# Patient Record
Sex: FEMALE | Race: BLACK OR AFRICAN AMERICAN | NOT HISPANIC OR LATINO | URBAN - METROPOLITAN AREA
[De-identification: names, ages, dates, MRNs, and addresses within clinical notes are randomized per-mention and may not be internally consistent; named-entity substitution may affect disease eponyms.]

---

## 2019-04-18 ENCOUNTER — EMERGENCY (EMERGENCY)
Facility: HOSPITAL | Age: 79
LOS: 0 days | Discharge: HOME | End: 2019-04-18
Attending: EMERGENCY MEDICINE
Payer: COMMERCIAL

## 2019-04-18 VITALS
HEART RATE: 71 BPM | SYSTOLIC BLOOD PRESSURE: 129 MMHG | OXYGEN SATURATION: 100 % | RESPIRATION RATE: 20 BRPM | WEIGHT: 145.06 LBS | HEIGHT: 64 IN | DIASTOLIC BLOOD PRESSURE: 85 MMHG

## 2019-04-18 VITALS — TEMPERATURE: 97 F
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
## 2019-04-18 DIAGNOSIS — I10 ESSENTIAL (PRIMARY) HYPERTENSION: ICD-10-CM

## 2019-04-18 DIAGNOSIS — R06.00 DYSPNEA, UNSPECIFIED: ICD-10-CM
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
## 2019-04-18 DIAGNOSIS — R55 SYNCOPE AND COLLAPSE: ICD-10-CM

## 2019-04-18 LAB
ALBUMIN SERPL ELPH-MCNC: 3.7 G/DL — SIGNIFICANT CHANGE UP (ref 3.3–5)
ALP SERPL-CCNC: 101 U/L — SIGNIFICANT CHANGE UP (ref 40–120)
ALT FLD-CCNC: 19 U/L — SIGNIFICANT CHANGE UP (ref 12–78)
ANION GAP SERPL CALC-SCNC: 9 MMOL/L — SIGNIFICANT CHANGE UP (ref 5–17)
APTT BLD: 24 SEC — LOW (ref 28.5–37)
AST SERPL-CCNC: 23 U/L — SIGNIFICANT CHANGE UP (ref 15–37)
BASOPHILS # BLD AUTO: 0.03 K/UL — SIGNIFICANT CHANGE UP (ref 0–0.2)
BASOPHILS NFR BLD AUTO: 0.9 % — SIGNIFICANT CHANGE UP (ref 0–2)
BILIRUB SERPL-MCNC: 0.4 MG/DL — SIGNIFICANT CHANGE UP (ref 0.2–1.2)
BUN SERPL-MCNC: 19 MG/DL — SIGNIFICANT CHANGE UP (ref 7–23)
CALCIUM SERPL-MCNC: 9.4 MG/DL — SIGNIFICANT CHANGE UP (ref 8.5–10.1)
CHLORIDE SERPL-SCNC: 103 MMOL/L — SIGNIFICANT CHANGE UP (ref 96–108)
CK MB BLD-MCNC: 1 % — SIGNIFICANT CHANGE UP (ref 0–3.5)
CK MB CFR SERPL CALC: 1.5 NG/ML — SIGNIFICANT CHANGE UP (ref 0.5–3.6)
CK SERPL-CCNC: 152 U/L — SIGNIFICANT CHANGE UP (ref 26–192)
CO2 SERPL-SCNC: 30 MMOL/L — SIGNIFICANT CHANGE UP (ref 22–31)
CREAT SERPL-MCNC: 1.21 MG/DL — SIGNIFICANT CHANGE UP (ref 0.5–1.3)
EOSINOPHIL # BLD AUTO: 0.03 K/UL — SIGNIFICANT CHANGE UP (ref 0–0.5)
EOSINOPHIL NFR BLD AUTO: 0.9 % — SIGNIFICANT CHANGE UP (ref 0–6)
GLUCOSE SERPL-MCNC: 135 MG/DL — HIGH (ref 70–99)
HCT VFR BLD CALC: 38.9 % — SIGNIFICANT CHANGE UP (ref 34.5–45)
HGB BLD-MCNC: 12.1 G/DL — SIGNIFICANT CHANGE UP (ref 11.5–15.5)
IMM GRANULOCYTES NFR BLD AUTO: 0.3 % — SIGNIFICANT CHANGE UP (ref 0–1.5)
INR BLD: 1.1 RATIO — SIGNIFICANT CHANGE UP (ref 0.88–1.16)
LYMPHOCYTES # BLD AUTO: 0.64 K/UL — LOW (ref 1–3.3)
LYMPHOCYTES # BLD AUTO: 19.8 % — SIGNIFICANT CHANGE UP (ref 13–44)
MCHC RBC-ENTMCNC: 26.8 PG — LOW (ref 27–34)
MCHC RBC-ENTMCNC: 31.1 GM/DL — LOW (ref 32–36)
MCV RBC AUTO: 86.1 FL — SIGNIFICANT CHANGE UP (ref 80–100)
MONOCYTES # BLD AUTO: 0.26 K/UL — SIGNIFICANT CHANGE UP (ref 0–0.9)
MONOCYTES NFR BLD AUTO: 8 % — SIGNIFICANT CHANGE UP (ref 2–14)
NEUTROPHILS # BLD AUTO: 2.26 K/UL — SIGNIFICANT CHANGE UP (ref 1.8–7.4)
NEUTROPHILS NFR BLD AUTO: 70.1 % — SIGNIFICANT CHANGE UP (ref 43–77)
NRBC # BLD: 0 /100 WBCS — SIGNIFICANT CHANGE UP (ref 0–0)
PLATELET # BLD AUTO: 257 K/UL — SIGNIFICANT CHANGE UP (ref 150–400)
POTASSIUM SERPL-MCNC: 3.6 MMOL/L — SIGNIFICANT CHANGE UP (ref 3.5–5.3)
POTASSIUM SERPL-SCNC: 3.6 MMOL/L — SIGNIFICANT CHANGE UP (ref 3.5–5.3)
PROT SERPL-MCNC: 8.2 GM/DL — SIGNIFICANT CHANGE UP (ref 6–8.3)
PROTHROM AB SERPL-ACNC: 12.3 SEC — SIGNIFICANT CHANGE UP (ref 10–12.9)
RBC # BLD: 4.52 M/UL — SIGNIFICANT CHANGE UP (ref 3.8–5.2)
RBC # FLD: 16.4 % — HIGH (ref 10.3–14.5)
SODIUM SERPL-SCNC: 142 MMOL/L — SIGNIFICANT CHANGE UP (ref 135–145)
TROPONIN I SERPL-MCNC: <.015 NG/ML — SIGNIFICANT CHANGE UP (ref 0.01–0.04)
TROPONIN I SERPL-MCNC: <.015 NG/ML — SIGNIFICANT CHANGE UP (ref 0.01–0.04)
WBC # BLD: 3.23 K/UL — LOW (ref 3.8–10.5)
WBC # FLD AUTO: 3.23 K/UL — LOW (ref 3.8–10.5)

## 2019-04-18 PROCEDURE — 93010 ELECTROCARDIOGRAM REPORT: CPT

## 2019-04-18 PROCEDURE — 71045 X-RAY EXAM CHEST 1 VIEW: CPT | Mod: 26

## 2019-04-18 PROCEDURE — 99284 EMERGENCY DEPT VISIT MOD MDM: CPT

## 2019-04-18 NOTE — ED ADULT TRIAGE NOTE - CHIEF COMPLAINT QUOTE
ems states, " IPt was at gym and had a syncopal episode, low bp in field 64/48 in field, pt now awake, and bp 120/70"

## 2019-04-18 NOTE — ED PROVIDER NOTE - OBJECTIVE STATEMENT
78 year old female with PMH of HTN presenting to ED due to dizziness after being on elliptical for 40 minutes then sat down and felt better after some rest- was told to come into ED for evaluation -denies any chest pain no LOC.

## 2019-04-18 NOTE — ED ADULT NURSE NOTE - NSIMPLEMENTINTERV_GEN_ALL_ED
Implemented All Fall Risk Interventions:  [place] to call system. Call bell, personal items and telephone within reach. Instruct patient to call for assistance. Room bathroom lighting operational. Non-slip footwear when patient is off stretcher. Physically safe environment: no spills, clutter or unnecessary equipment. Stretcher in lowest position, wheels locked, appropriate side rails in place. Provide visual cue, wrist band, yellow gown, etc. Monitor gait and stability. Monitor for mental status changes and reorient to person, place, and time. Review medications for side effects contributing to fall risk. Reinforce activity limits and safety measures with patient and family.

## 2019-04-18 NOTE — ED ADULT NURSE NOTE - OBJECTIVE STATEMENT
79 y/o female A&Ox4, [place], PMH HTN, presents to ED s/p near syncopal episode occuring earlier today. Pt states she was "at the spa" when she began to feel dizzy and light-headed. Pt states she was assisted by staff to ground, pt denies head trauma/falls. Sent ot ED BIBEMS for further evaluation. Upon further assessment, airway patent and intact, denies chest pain/SOB at the moment. ABD soft nontender, Skin intact. Peripheral pulses strong and normal baseline sensation present x4. Safety and comfort measures maintained.

## 2025-03-08 ENCOUNTER — INPATIENT (INPATIENT)
Facility: HOSPITAL | Age: 85
LOS: 16 days | Discharge: NURSING, REHAB OR LONG-TERM CARE | End: 2025-03-25
Attending: INTERNAL MEDICINE | Admitting: INTERNAL MEDICINE
Payer: MEDICARE

## 2025-03-08 VITALS
WEIGHT: 145.06 LBS | TEMPERATURE: 98 F | RESPIRATION RATE: 20 BRPM | HEART RATE: 63 BPM | DIASTOLIC BLOOD PRESSURE: 69 MMHG | OXYGEN SATURATION: 100 % | HEIGHT: 67 IN | SYSTOLIC BLOOD PRESSURE: 115 MMHG

## 2025-03-08 DIAGNOSIS — F03.90 UNSPECIFIED DEMENTIA, UNSPECIFIED SEVERITY, WITHOUT BEHAVIORAL DISTURBANCE, PSYCHOTIC DISTURBANCE, MOOD DISTURBANCE, AND ANXIETY: ICD-10-CM

## 2025-03-08 DIAGNOSIS — I10 ESSENTIAL (PRIMARY) HYPERTENSION: ICD-10-CM

## 2025-03-08 DIAGNOSIS — G93.41 METABOLIC ENCEPHALOPATHY: ICD-10-CM

## 2025-03-08 DIAGNOSIS — K44.9 DIAPHRAGMATIC HERNIA WITHOUT OBSTRUCTION OR GANGRENE: ICD-10-CM

## 2025-03-08 DIAGNOSIS — F20.0 PARANOID SCHIZOPHRENIA: ICD-10-CM

## 2025-03-08 DIAGNOSIS — N17.9 ACUTE KIDNEY FAILURE, UNSPECIFIED: ICD-10-CM

## 2025-03-08 DIAGNOSIS — N39.0 URINARY TRACT INFECTION, SITE NOT SPECIFIED: ICD-10-CM

## 2025-03-08 LAB
ALBUMIN SERPL ELPH-MCNC: 3.7 G/DL — SIGNIFICANT CHANGE UP (ref 3.3–5)
ALP SERPL-CCNC: 99 U/L — SIGNIFICANT CHANGE UP (ref 40–120)
ALT FLD-CCNC: 19 U/L — SIGNIFICANT CHANGE UP (ref 12–78)
ANION GAP SERPL CALC-SCNC: 7 MMOL/L — SIGNIFICANT CHANGE UP (ref 5–17)
ANISOCYTOSIS BLD QL: SLIGHT — SIGNIFICANT CHANGE UP
APPEARANCE UR: ABNORMAL
AST SERPL-CCNC: 33 U/L — SIGNIFICANT CHANGE UP (ref 15–37)
BACTERIA # UR AUTO: ABNORMAL /HPF
BASOPHILS # BLD AUTO: 0 K/UL — SIGNIFICANT CHANGE UP (ref 0–0.2)
BASOPHILS NFR BLD AUTO: 0 % — SIGNIFICANT CHANGE UP (ref 0–2)
BILIRUB SERPL-MCNC: 1.6 MG/DL — HIGH (ref 0.2–1.2)
BILIRUB UR-MCNC: ABNORMAL
BUN SERPL-MCNC: 25 MG/DL — HIGH (ref 7–23)
CALCIUM SERPL-MCNC: 10.6 MG/DL — HIGH (ref 8.5–10.1)
CHLORIDE SERPL-SCNC: 109 MMOL/L — HIGH (ref 96–108)
CO2 SERPL-SCNC: 29 MMOL/L — SIGNIFICANT CHANGE UP (ref 22–31)
COLOR SPEC: ABNORMAL
CREAT SERPL-MCNC: 1.32 MG/DL — HIGH (ref 0.5–1.3)
DIFF PNL FLD: ABNORMAL
EGFR: 40 ML/MIN/1.73M2 — LOW
EGFR: 40 ML/MIN/1.73M2 — LOW
ELLIPTOCYTES BLD QL SMEAR: SLIGHT — SIGNIFICANT CHANGE UP
EOSINOPHIL # BLD AUTO: 0 K/UL — SIGNIFICANT CHANGE UP (ref 0–0.5)
EOSINOPHIL NFR BLD AUTO: 0 % — SIGNIFICANT CHANGE UP (ref 0–6)
EPI CELLS # UR: PRESENT
FLUAV AG NPH QL: SIGNIFICANT CHANGE UP
FLUBV AG NPH QL: SIGNIFICANT CHANGE UP
GLUCOSE SERPL-MCNC: 104 MG/DL — HIGH (ref 70–99)
GLUCOSE UR QL: 100 MG/DL
HCT VFR BLD CALC: 43.8 % — SIGNIFICANT CHANGE UP (ref 34.5–45)
HGB BLD-MCNC: 14.1 G/DL — SIGNIFICANT CHANGE UP (ref 11.5–15.5)
KETONES UR-MCNC: 80 MG/DL
LEUKOCYTE ESTERASE UR-ACNC: ABNORMAL
LYMPHOCYTES # BLD AUTO: 0.38 K/UL — LOW (ref 1–3.3)
LYMPHOCYTES # BLD AUTO: 8 % — LOW (ref 13–44)
MANUAL SMEAR VERIFICATION: SIGNIFICANT CHANGE UP
MCHC RBC-ENTMCNC: 30.3 PG — SIGNIFICANT CHANGE UP (ref 27–34)
MCHC RBC-ENTMCNC: 32.2 G/DL — SIGNIFICANT CHANGE UP (ref 32–36)
MCV RBC AUTO: 94 FL — SIGNIFICANT CHANGE UP (ref 80–100)
MONOCYTES # BLD AUTO: 0.24 K/UL — SIGNIFICANT CHANGE UP (ref 0–0.9)
MONOCYTES NFR BLD AUTO: 5 % — SIGNIFICANT CHANGE UP (ref 2–14)
NEUTROPHILS # BLD AUTO: 4 K/UL — SIGNIFICANT CHANGE UP (ref 1.8–7.4)
NEUTROPHILS NFR BLD AUTO: 85 % — HIGH (ref 43–77)
NITRITE UR-MCNC: POSITIVE
NRBC # BLD: 0 /100 WBCS — SIGNIFICANT CHANGE UP (ref 0–0)
NRBC BLD AUTO-RTO: SIGNIFICANT CHANGE UP /100 WBCS (ref 0–0)
NRBC BLD-RTO: 0 /100 WBCS — SIGNIFICANT CHANGE UP (ref 0–0)
PH UR: 6 — SIGNIFICANT CHANGE UP (ref 5–8)
PLAT MORPH BLD: NORMAL — SIGNIFICANT CHANGE UP
PLATELET # BLD AUTO: 175 K/UL — SIGNIFICANT CHANGE UP (ref 150–400)
POIKILOCYTOSIS BLD QL AUTO: SLIGHT — SIGNIFICANT CHANGE UP
POTASSIUM SERPL-MCNC: 5.3 MMOL/L — SIGNIFICANT CHANGE UP (ref 3.5–5.3)
POTASSIUM SERPL-SCNC: 5.3 MMOL/L — SIGNIFICANT CHANGE UP (ref 3.5–5.3)
PROT SERPL-MCNC: 8.2 GM/DL — SIGNIFICANT CHANGE UP (ref 6–8.3)
PROT UR-MCNC: 300 MG/DL
RBC # BLD: 4.66 M/UL — SIGNIFICANT CHANGE UP (ref 3.8–5.2)
RBC # FLD: 14 % — SIGNIFICANT CHANGE UP (ref 10.3–14.5)
RBC BLD AUTO: ABNORMAL
RBC CASTS # UR COMP ASSIST: 30 /HPF — HIGH (ref 0–4)
RSV RNA NPH QL NAA+NON-PROBE: SIGNIFICANT CHANGE UP
SARS-COV-2 RNA SPEC QL NAA+PROBE: SIGNIFICANT CHANGE UP
SODIUM SERPL-SCNC: 145 MMOL/L — SIGNIFICANT CHANGE UP (ref 135–145)
SP GR SPEC: >1.03 — HIGH (ref 1–1.03)
UROBILINOGEN FLD QL: 1 MG/DL — SIGNIFICANT CHANGE UP (ref 0.2–1)
VARIANT LYMPHS # BLD: 2 % — SIGNIFICANT CHANGE UP (ref 0–6)
VARIANT LYMPHS NFR BLD MANUAL: 2 % — SIGNIFICANT CHANGE UP (ref 0–6)
WBC # BLD: 4.7 K/UL — SIGNIFICANT CHANGE UP (ref 3.8–10.5)
WBC # FLD AUTO: 4.7 K/UL — SIGNIFICANT CHANGE UP (ref 3.8–10.5)
WBC UR QL: 10 /HPF — HIGH (ref 0–5)

## 2025-03-08 PROCEDURE — 71046 X-RAY EXAM CHEST 2 VIEWS: CPT | Mod: 26

## 2025-03-08 PROCEDURE — 93010 ELECTROCARDIOGRAM REPORT: CPT

## 2025-03-08 PROCEDURE — 99285 EMERGENCY DEPT VISIT HI MDM: CPT

## 2025-03-08 PROCEDURE — 99222 1ST HOSP IP/OBS MODERATE 55: CPT

## 2025-03-08 PROCEDURE — 70450 CT HEAD/BRAIN W/O DYE: CPT | Mod: 26

## 2025-03-08 RX ORDER — MELATONIN 5 MG
3 TABLET ORAL AT BEDTIME
Refills: 0 | Status: DISCONTINUED | OUTPATIENT
Start: 2025-03-08 | End: 2025-03-25

## 2025-03-08 RX ORDER — CEFTRIAXONE 500 MG/1
1000 INJECTION, POWDER, FOR SOLUTION INTRAMUSCULAR; INTRAVENOUS EVERY 24 HOURS
Refills: 0 | Status: COMPLETED | OUTPATIENT
Start: 2025-03-09 | End: 2025-03-11

## 2025-03-08 RX ORDER — ACETAMINOPHEN 500 MG/5ML
650 LIQUID (ML) ORAL EVERY 6 HOURS
Refills: 0 | Status: DISCONTINUED | OUTPATIENT
Start: 2025-03-08 | End: 2025-03-25

## 2025-03-08 RX ORDER — ONDANSETRON HCL/PF 4 MG/2 ML
4 VIAL (ML) INJECTION EVERY 8 HOURS
Refills: 0 | Status: DISCONTINUED | OUTPATIENT
Start: 2025-03-08 | End: 2025-03-25

## 2025-03-08 RX ORDER — CEFTRIAXONE 500 MG/1
1000 INJECTION, POWDER, FOR SOLUTION INTRAMUSCULAR; INTRAVENOUS ONCE
Refills: 0 | Status: COMPLETED | OUTPATIENT
Start: 2025-03-08 | End: 2025-03-08

## 2025-03-08 RX ORDER — OMEPRAZOLE 20 MG/1
1 CAPSULE, DELAYED RELEASE ORAL
Refills: 0 | DISCHARGE

## 2025-03-08 RX ORDER — AMLODIPINE BESYLATE 10 MG/1
10 TABLET ORAL DAILY
Refills: 0 | Status: DISCONTINUED | OUTPATIENT
Start: 2025-03-08 | End: 2025-03-08

## 2025-03-08 RX ORDER — MAGNESIUM, ALUMINUM HYDROXIDE 200-200 MG
30 TABLET,CHEWABLE ORAL EVERY 4 HOURS
Refills: 0 | Status: DISCONTINUED | OUTPATIENT
Start: 2025-03-08 | End: 2025-03-25

## 2025-03-08 RX ADMIN — CEFTRIAXONE 1000 MILLIGRAM(S): 500 INJECTION, POWDER, FOR SOLUTION INTRAMUSCULAR; INTRAVENOUS at 19:00

## 2025-03-08 RX ADMIN — Medication 500 MILLILITER(S): at 17:29

## 2025-03-08 RX ADMIN — Medication 500 MILLILITER(S): at 16:29

## 2025-03-08 RX ADMIN — CEFTRIAXONE 100 MILLIGRAM(S): 500 INJECTION, POWDER, FOR SOLUTION INTRAMUSCULAR; INTRAVENOUS at 18:30

## 2025-03-08 NOTE — ED PROVIDER NOTE - PROGRESS NOTE DETAILS
GALINA Campbell: all results reviewed with patient and called to speak with family, +UTI, given IV abx, will admit for UTI/AMS and evaluation for placement or services at home, Dr Whitman aware of results, will admit.

## 2025-03-08 NOTE — ED ADULT NURSE NOTE - CHIEF COMPLAINT QUOTE
General weakness, poor appetite, has a home health aide, daughter states camera says she hasn't moved since March 2  to EMS  H/O Dementia HTN [place]

## 2025-03-08 NOTE — ED ADULT NURSE NOTE - OBJECTIVE STATEMENT
83 yo female, alert, confused at baseline per daughter. Presents to ED accompanied by daughter. Per daughter patient is here for generalized weakness, poor appetite, and chest congestion. Per daughter patient has a home health aide, and states camera says patient hasn't moved since March 2. No skin breakdown or injuries noted  H/O Dementia HTN [place]

## 2025-03-08 NOTE — ED ADULT TRIAGE NOTE - CHIEF COMPLAINT QUOTE
General weakness, poor appetite, has a home health aide, daughter states camera says she hasn't moved since March 2  to EMS  H/O Dementia HTN [place]

## 2025-03-08 NOTE — ED ADULT NURSE NOTE - NSFALLRISKINTERV_ED_ALL_ED

## 2025-03-08 NOTE — ED PROVIDER NOTE - CARE PLAN
1 Principal Discharge DX:	UTI (urinary tract infection)  Secondary Diagnosis:	Encounter for social work intervention

## 2025-03-08 NOTE — H&P ADULT - ASSESSMENT
Chloe Blackmon is an 84 year old female with PMHx of HTN, paranoid schizophrenia, and dementia who presented to the ED on 3/8/25 for complaints of inability to care for herself and admitted for acute metabolic encephalopathy secondary to UTI.    Acute metabolic encephalopathy secondary to UTI  Son reports patient with declining mentation since he last saw her two weeks ago  Baseline mentation is AAO x 2 to person and place, now only AAO x 1 to person only  U/A with proteinuria, ketonuria, large bilirubin, small leuks, positive nitrites, WBC 10, RBC 30, moderate bacteria, squamous epithelial cells present, glucosuria, COVID/influenza/RSV negative  CT head without acute intracranial findings, CXR without infectious etiology but with moderate-sized hiatal hernia  S/p ceftriaxone 1 g IV in the ED  Ceftriaxone 1 g IV continued for now  Reorient PRN, avoid sedating meds  F/u TSH, RPR, vitamin B12, MRI brain to r/o other etiologies of AMS  Monitor for improvement in mentation    PATRICIO vs. elevated creatinine, suspect etiology prerenal secondary to decreased PO intake  BUN 25 and Cr 1.32 on admission, unknown baseline but Cr 0.89 (on 12/8/20)  S/p  cc bolus in the ED  Avoid nephrotoxins, renally dose meds  Encourage PO hydration  Monitor renal function    Social admit  Son reports patient with bouts of paranoid schizophrenia where she will chain up her apartment door so no one can come in  Son did not notice movement on cameras in patient's apartment for a week, used  to cut chains upon arrival to her apartment  Found in bed soiled in urine and feces and apartment is unkempt  Previously declining coming to live with son and daughter-in-law in [place]  Son requesting placement in facility for safety as patient is unable to care for herself  Case management and  consulted for assistance in disposition    Hiatal hernia, incidental finding  As seen on CXR  [facility] indicates previously taking omeprazole DR 20 mg  Will need outpatient f/u      Chronic medical conditions:  HTN, uncontrolled secondary to medication noncompliance: BP as elevated as 198/83, will hold off on initiation of antihypertensives for now given patient refusal to take PO meds, [facility] indicates previously on amlodipine 10 mg  Dementia: not on any PTA meds, turn and reposition q2    Unable to complete medication reconciliation as son does not know if patient takes any medications. As per [facility], last picked up amlodipine 10 mg and omeprazole DR 20 mg in May 2024. Please call her pharmacy for med list in AM.    Plan of care discussed with son over the phone. Chloe Blackmon is an 84 year old female with PMHx of HTN, paranoid schizophrenia, and dementia who presented to the ED on 3/8/25 for complaints of inability to care for herself and admitted for acute metabolic encephalopathy secondary to UTI.    Acute metabolic encephalopathy secondary to UTI  Son reports patient with declining mentation since he last saw her two weeks ago  Baseline mentation is AAO x 2 to person and place, now only AAO x 1 to person only  U/A with proteinuria, ketonuria, large bilirubin, small leuks, positive nitrites, WBC 10, RBC 30, moderate bacteria, squamous epithelial cells present, glucosuria, COVID/influenza/RSV negative  CT head without acute intracranial findings, CXR without infectious etiology but with moderate-sized hiatal hernia  S/p ceftriaxone 1 g IV in the ED  Ceftriaxone 1 g IV continued for now  Reorient PRN, avoid sedating meds  F/u urine culture  F/u TSH, RPR, vitamin B12, MRI brain to r/o other etiologies of AMS  Monitor for improvement in mentation    PATRICIO vs. elevated creatinine, suspect etiology prerenal secondary to decreased PO intake  BUN 25 and Cr 1.32 on admission, unknown baseline but Cr 0.89 (on 12/8/20)  S/p  cc bolus in the ED  Avoid nephrotoxins, renally dose meds  Encourage PO hydration  Monitor renal function    Social admit  Son reports patient with bouts of paranoid schizophrenia where she will chain up her apartment door so no one can come in  Son did not notice movement on cameras in patient's apartment for a week, used  to cut chains upon arrival to her apartment  Found in bed soiled in urine and feces and apartment is unkempt  Previously declining coming to live with son and daughter-in-law in [place]  Son requesting placement in facility for safety as patient is unable to care for herself  Case management and  consulted for assistance in disposition    Hiatal hernia, incidental finding  As seen on CXR  [facility] indicates previously taking omeprazole DR 20 mg  Will need outpatient f/u      Chronic medical conditions:  HTN, uncontrolled secondary to medication noncompliance: BP as elevated as 198/83, will hold off on initiation of antihypertensives for now given patient refusal to take PO meds, [facility] indicates previously on amlodipine 10 mg  Dementia: not on any PTA meds, turn and reposition q2    Unable to complete medication reconciliation as son does not know if patient takes any medications. As per [facility], last picked up amlodipine 10 mg and omeprazole DR 20 mg in May 2024. Please call her pharmacy for med list in AM.    Plan of care discussed with son over the phone.

## 2025-03-08 NOTE — ED PROVIDER NOTE - CLINICAL SUMMARY MEDICAL DECISION MAKING FREE TEXT BOX
GALINA Campbell: 84-year-old female with no reported past medical history per daughter-in-law at bedside other than dementia, hard of hearing presents emergency room for generalized weakness.  Daughter states that there was no movement on the plane, for the past 6 days, went there today and had abrasions to the apartment and saw her laying in bed, had difficulty getting up out of bed herself.  States she has no help at home, lives independently and normally ambulates without difficulty.  States for the past 3 weeks they have not been able to get her on the phone but still saw movement on the cameras.  Patient denying any pain or complaints at this time.  No home health aide at home.  Vital signs stable, well-appearing in no acute distress, alert and oriented x 2, heart and lungs normal on exam, abdomen soft nontender nondistended, no evidence of trauma, full range of motion of all extremities, no spinal tenderness, no focal neurodeficit.  Concern for progression of dementia versus acute infection (UTI versus pneumonia), less likely intracranial pathology such as intracranial hemorrhage for stroke or viral syndrome.  Will get labs, IV fluids, urine, chest x-ray, CT head.  Will plan for admission for social work at minimum and placement and/or services at home.    Son Carlos Blackmon [phone] (next of kin); Daughter in law Sandra Blackmon [phone]

## 2025-03-08 NOTE — GOALS OF CARE CONVERSATION - ADVANCED CARE PLANNING - CONVERSATION DETAILS
Discussed with son, Carlos Blackmon over the phone as patient is unable to make her own decisions due to dementia. As per son, code status is full code. Would want chest compressions and intubation if needed. Wants all life-sustaining measures. No limitations on care at this time. . Decision maker is sonCarlos ([phone]).

## 2025-03-08 NOTE — H&P ADULT - TIME BILLING
coordination of care with ER PA and ER RN, obtaining history, performing a physical examination, reviewing and interpreting labs and imaging, ordering further studies and tests, explaining the diagnosis and treatment plan to son over the phone, attempting to complete medication reconciliation to the best of my ability, and documentation  as above.

## 2025-03-08 NOTE — H&P ADULT - NSHPPHYSICALEXAM_GEN_ALL_CORE
T(C): 36.7 (03-08-25 @ 19:52), Max: 36.8 (03-08-25 @ 15:32)  HR: 71 (03-08-25 @ 19:52) (58 - 71)  BP: 156/84 (03-08-25 @ 19:52) (115/69 - 198/83)  RR: 18 (03-08-25 @ 19:52) (16 - 20)  SpO2: 98% (03-08-25 @ 19:52) (95% - 100%)    CONSTITUTIONAL: no apparent distress  EYES: PERRLA and symmetric, EOMI  ENMT: Oral mucosa with dry membranes, hard of hearing b/l  RESP: No respiratory distress, no use of accessory muscles; CTA b/l  CV: RRR  GI: Soft, NT, ND  NEURO: alert, responds to name, unable to perform neurological exam

## 2025-03-08 NOTE — H&P ADULT - HISTORY OF PRESENT ILLNESS
Chloe Blackmon is an 84 year old female with PMHx of HTN, paranoid schizophrenia, and dementia who presented to the ED on 3/8/25 for complaints of inability to care for herself.    History primarily obtained from son, Carlos Blackmon ([phone]) over the phone as patient is unable to provide history due to dementia and AMS. Son reports he last came to visit patient about two weeks ago. At that time, ambulatory unassisted and independent with all ADLs. There are cameras set up in patient's apartment and son noticed there was no movement reported on the cameras for the past week. He attempted to call her home phone but she did not . Therefore, he drove from [place] to her home today. When he arrived, he saw a chain on her apartment door. Son reports patient has bouts of paranoid schizophrenia where she chains up her door so no one can come inside. He cut the chain off with bolt cutters and found patient in bed covered in urine and feces. Son states there is no camera in her bedroom. Of note, patient is very hard of hearing and has a phone for hearing impaired but son states she stopped picking up the phone and refuses to wear hearing aids. Son states patient is supposed to take antihypertensive but refuses to take any medications. In addition, son states patient's apartment is unkempt and smells. Son does not think it is safe for patient to live alone and states she has refused to come live with him at his house so therefore, he would like her placed in a facility for safety. Patient herself is unable to provide any history and just keeps saying, "I do not hear well" despite me shouting in attempt to obtain history from patient.    In the ED, VSS except BP as elevated as 198/83. CBC unremarkable. BUN 25, Cr 1.32, Tbili 1.6. U/A with proteinuria, ketonuria, large bilirubin, small leuks, positive nitrites, WBC 10, RBC 30, moderate bacteria, squamous epithelial cells present, glucosuria. COVID/influenza/RSV negative. CT head without acute intracranial findings. CXR with moderate-sized hiatal hernia. Received  cc bolus and ceftriaxone 1 g IV.

## 2025-03-08 NOTE — H&P ADULT - NSHPLABSRESULTS_GEN_ALL_CORE
14.1   4.70  )-----------( 175      ( 08 Mar 2025 13:40 )             43.8     145  |  109[H]  |  25[H]  ----------------------------<  104[H]     03-08  5.3   |  29  |  1.32[H]    Ca    10.6[H]      08 Mar 2025 13:40  Phos  3.5     03-08  Mg     2.4     03-08    TPro  8.2  /  Alb  3.7  /  TBili  1.6[H]  /  DBili  x   /  AST  33  /  ALT  19  /  AlkPhos  99  03-08    Urinalysis Basic - ( 08 Mar 2025 17:20 )  Color: Orange / Appearance: Cloudy / SG: >1.030 / pH: 6.0  Gluc: 100 mg/dL / Ketone: 80 mg/dL  / Bili: Large / Urobili: 1.0 mg/dL   Blood: Non Hemolyzed Trace / Protein: 300 mg/dL / Nitrite: Positive   Leuk Esterase: Small / RBC: 30 /HPF / WBC 10 /HPF   Sq Epi: x / Bacteria: Moderate /HPF  Hyaline Casts: x/WBC Casts: x    Urinalysis with Rflx Culture (collected 08 Mar 2025 17:20)    CT head without contrast 3/8/25  IMPRESSION:  No evidence of acute intracranial hemorrhage or midline shift.    Chest x-ray 3/8/25  FINDINGS/IMPRESSION:   The heart size, mediastinal and hilar contours are unchanged and within normal limits. Again noted is a moderate-sized hiatal hernia. Lung zones are clear. Soft tissues and osseous structures are intact.

## 2025-03-09 LAB
ANION GAP SERPL CALC-SCNC: 7 MMOL/L — SIGNIFICANT CHANGE UP (ref 5–17)
BUN SERPL-MCNC: 22 MG/DL — SIGNIFICANT CHANGE UP (ref 7–23)
CALCIUM SERPL-MCNC: 10.3 MG/DL — HIGH (ref 8.5–10.1)
CHLORIDE SERPL-SCNC: 111 MMOL/L — HIGH (ref 96–108)
CO2 SERPL-SCNC: 30 MMOL/L — SIGNIFICANT CHANGE UP (ref 22–31)
CREAT SERPL-MCNC: 1.11 MG/DL — SIGNIFICANT CHANGE UP (ref 0.5–1.3)
CULTURE RESULTS: SIGNIFICANT CHANGE UP
EGFR: 49 ML/MIN/1.73M2 — LOW
EGFR: 49 ML/MIN/1.73M2 — LOW
GLUCOSE SERPL-MCNC: 93 MG/DL — SIGNIFICANT CHANGE UP (ref 70–99)
POTASSIUM SERPL-MCNC: 3.7 MMOL/L — SIGNIFICANT CHANGE UP (ref 3.5–5.3)
POTASSIUM SERPL-SCNC: 3.7 MMOL/L — SIGNIFICANT CHANGE UP (ref 3.5–5.3)
SODIUM SERPL-SCNC: 148 MMOL/L — HIGH (ref 135–145)
SPECIMEN SOURCE: SIGNIFICANT CHANGE UP
TSH SERPL-MCNC: 1.98 UU/ML — SIGNIFICANT CHANGE UP (ref 0.36–3.74)
VIT B12 SERPL-MCNC: 1482 PG/ML — HIGH (ref 232–1245)

## 2025-03-09 PROCEDURE — 99232 SBSQ HOSP IP/OBS MODERATE 35: CPT

## 2025-03-09 RX ORDER — SODIUM CHLORIDE 9 G/1000ML
1000 INJECTION, SOLUTION INTRAVENOUS
Refills: 0 | Status: DISCONTINUED | OUTPATIENT
Start: 2025-03-09 | End: 2025-03-12

## 2025-03-09 RX ADMIN — SODIUM CHLORIDE 100 MILLILITER(S): 9 INJECTION, SOLUTION INTRAVENOUS at 14:09

## 2025-03-09 RX ADMIN — SODIUM CHLORIDE 100 MILLILITER(S): 9 INJECTION, SOLUTION INTRAVENOUS at 21:13

## 2025-03-09 RX ADMIN — CEFTRIAXONE 100 MILLIGRAM(S): 500 INJECTION, POWDER, FOR SOLUTION INTRAMUSCULAR; INTRAVENOUS at 17:35

## 2025-03-09 NOTE — PATIENT PROFILE ADULT - FUNCTIONAL ASSESSMENT - BASIC MOBILITY 6.
1-calculated by average/Not able to assess (calculate score using [facility] averaging method)

## 2025-03-10 LAB — T PALLIDUM AB TITR SER: NEGATIVE — SIGNIFICANT CHANGE UP

## 2025-03-10 PROCEDURE — 71250 CT THORAX DX C-: CPT | Mod: 26

## 2025-03-10 PROCEDURE — 99232 SBSQ HOSP IP/OBS MODERATE 35: CPT

## 2025-03-10 RX ADMIN — SODIUM CHLORIDE 100 MILLILITER(S): 9 INJECTION, SOLUTION INTRAVENOUS at 05:16

## 2025-03-10 RX ADMIN — CEFTRIAXONE 100 MILLIGRAM(S): 500 INJECTION, POWDER, FOR SOLUTION INTRAMUSCULAR; INTRAVENOUS at 17:07

## 2025-03-10 RX ADMIN — SODIUM CHLORIDE 100 MILLILITER(S): 9 INJECTION, SOLUTION INTRAVENOUS at 22:28

## 2025-03-10 NOTE — PHYSICAL THERAPY INITIAL EVALUATION ADULT - TRANSFER TRAINING, PT EVAL
Pt will be able to perform sit to stand, stand pivot transfer using [RW]  with CGA in 2 to 3 weeks  to improve functional transfers between any 2 surfaces

## 2025-03-10 NOTE — CONSULT NOTE ADULT - ASSESSMENT
84F with left breast wound    Continue local wound care  no further surgical intervention indicated at this time  Continue management per primary team 84F with left breast wound    Continue local wound care with xeroform gauze, covered with dry sterile dressing  no further surgical intervention indicated at this time  Continue management per primary team

## 2025-03-10 NOTE — PHYSICAL THERAPY INITIAL EVALUATION ADULT - BALANCE TRAINING, PT EVAL
Pt will improve static & dynamic standing balance to Good using [Rolling walker]   to perform  Gait  with sup in 2 to 3 weeks

## 2025-03-10 NOTE — PHYSICAL THERAPY INITIAL EVALUATION ADULT - GAIT TRAINING, PT EVAL
Pt will be able to ambulate 100 feet using [RW] with CGA in 2 to 3 weeks to improve functional mobility

## 2025-03-10 NOTE — PROGRESS NOTE ADULT - ASSESSMENT
Chloe Blackmon is an 84 year old female with PMHx of HTN, paranoid schizophrenia, and dementia who presented to the ED on 3/8/25 for complaints of inability to care for herself and admitted for acute metabolic encephalopathy secondary to UTI.    ##Acute metabolic encephalopathy secondary to UTI  Son reports patient with declining mentation since he last saw her two weeks ago  Baseline mentation is AAO x 2 to person and place, now only AAO x 1 to person only  U/A with proteinuria, ketonuria, large bilirubin, small leuks, positive nitrites, WBC 10, RBC 30, moderate bacteria, squamous epithelial cells present, glucosuria, COVID/influenza/RSV negative  CT head without acute intracranial findings, CXR without infectious etiology but with moderate-sized hiatal hernia. S/p ceftriaxone 1 g IV in the ED. B12 elevated.   - Ceftriaxone 1 g IV continued for now  - Reorient PRN, avoid sedating meds  - F/u urine culture  - F/u TSH, RPR, MRI brain to r/o other etiologies of AMS    #PATRICIO vs. elevated creatinine, suspect etiology prerenal secondary to decreased PO intake  BUN 25 and Cr 1.32 on admission, improved to 1.11.   S/p  cc bolus in the ED  Avoid nephrotoxins, renally dose meds  Encourage PO hydration  Monitor renal function    #Social admit  Son reports patient with bouts of paranoid schizophrenia where she will chain up her apartment door so no one can come in. Son did not notice movement on cameras in patient's apartment for a week, used  to cut chains upon arrival to her apartment. Found in bed soiled in urine and feces and apartment is unkempt  Previously declining coming to live with son and daughter-in-law in [place]. Son requesting placement in facility for safety as patient is unable to care for herself. Case management and  consulted for assistance in disposition    #Hiatal hernia, incidental finding  - Will need outpatient f/u, family aware     #HTN, uncontrolled secondary to medication noncompliance  BP as elevated as 198/83, will hold off on initiation of antihypertensives for now given patient refusal to take PO meds, [facility] indicates previously on amlodipine 10 mg    #Dementia: not on any PTA meds, turn and reposition q2    Diet: regular   DVT prophylaxis:   Dispo: Admit, will need placement   Code Status: FC     I have spent a total of 38 minutes to prepare to see the patient, obtaining and reviewing history, physical examination, explaining the diagnosis, prognosis and treatment plan with the patient/family/caregiver. I also have spent the time ordering studies and testing, interpreting results, medicine reconciliation, IDRs, subspecialty consultation and documentation as above.       Chloe Blackmon is an 84 year old female with PMHx of HTN, paranoid schizophrenia, and dementia who presented to the ED on 3/8/25 for complaints of inability to care for herself and admitted for acute metabolic encephalopathy secondary to UTI.    ##Acute metabolic encephalopathy secondary to UTI  Son reports patient with declining mentation since he last saw her two weeks ago  Baseline mentation is AAO x 2 to person and place, now only AAO x 1 to person only  U/A with proteinuria, ketonuria, large bilirubin, small leuks, positive nitrites, WBC 10, RBC 30, moderate bacteria, squamous epithelial cells present, glucosuria, COVID/influenza/RSV negative  CT head without acute intracranial findings, CXR without infectious etiology but with moderate-sized hiatal hernia. S/p ceftriaxone 1 g IV in the ED. B12 elevated. UCx normal filippo. TSH WNL RPR negative.   - Ceftriaxone 1 g IV continued for now  - Pending MR Brain   - Reorient PRN, avoid sedating meds    #PATRICIO vs. elevated creatinine, suspect etiology prerenal secondary to decreased PO intake  BUN 25 and Cr 1.32 on admission, improved to 1.11.   S/p  cc bolus in the ED  Avoid nephrotoxins, renally dose meds  Encourage PO hydration  Monitor renal function    #Social admit  Son reports patient with bouts of paranoid schizophrenia where she will chain up her apartment door so no one can come in. Son did not notice movement on cameras in patient's apartment for a week, used  to cut chains upon arrival to her apartment. Found in bed soiled in urine and feces and apartment is unkempt  Previously declining coming to live with son and daughter-in-law in [place]. Son requesting placement in facility for safety as patient is unable to care for herself. Case management and  consulted for assistance in disposition    #Hiatal hernia, incidental finding  - Will need outpatient f/u, family aware     #HTN, uncontrolled secondary to medication noncompliance  BP as elevated as 198/83, will hold off on initiation of antihypertensives for now given patient refusal to take PO meds, [facility] indicates previously on amlodipine 10 mg    #Dementia: not on any PTA meds, turn and reposition q2    Diet: regular   DVT prophylaxis:   Dispo: Admit, will need placement   Code Status: FC     I have spent a total of 35 minutes to prepare to see the patient, obtaining and reviewing history, physical examination, explaining the diagnosis, prognosis and treatment plan with the patient/family/caregiver. I also have spent the time ordering studies and testing, interpreting results, medicine reconciliation, IDRs, subspecialty consultation and documentation as above.

## 2025-03-10 NOTE — PHYSICAL THERAPY INITIAL EVALUATION ADULT - PATIENT/FAMILY/SIGNIFICANT OTHER GOALS STATEMENT, PT EVAL
[place] standing orders have been placed. Refer to infant’s chart for further details.
To get stronger, to be wound free

## 2025-03-11 LAB
ANION GAP SERPL CALC-SCNC: 9 MMOL/L — SIGNIFICANT CHANGE UP (ref 5–17)
BASOPHILS # BLD AUTO: 0.03 K/UL — SIGNIFICANT CHANGE UP (ref 0–0.2)
BASOPHILS NFR BLD AUTO: 1.4 % — SIGNIFICANT CHANGE UP (ref 0–2)
BUN SERPL-MCNC: 9 MG/DL — SIGNIFICANT CHANGE UP (ref 7–23)
CALCIUM SERPL-MCNC: 9.1 MG/DL — SIGNIFICANT CHANGE UP (ref 8.5–10.1)
CHLORIDE SERPL-SCNC: 98 MMOL/L — SIGNIFICANT CHANGE UP (ref 96–108)
CO2 SERPL-SCNC: 31 MMOL/L — SIGNIFICANT CHANGE UP (ref 22–31)
CREAT SERPL-MCNC: 0.76 MG/DL — SIGNIFICANT CHANGE UP (ref 0.5–1.3)
EGFR: 77 ML/MIN/1.73M2 — SIGNIFICANT CHANGE UP
EGFR: 77 ML/MIN/1.73M2 — SIGNIFICANT CHANGE UP
EOSINOPHIL # BLD AUTO: 0.02 K/UL — SIGNIFICANT CHANGE UP (ref 0–0.5)
EOSINOPHIL NFR BLD AUTO: 1 % — SIGNIFICANT CHANGE UP (ref 0–6)
GLUCOSE BLDC GLUCOMTR-MCNC: 90 MG/DL — SIGNIFICANT CHANGE UP (ref 70–99)
GLUCOSE SERPL-MCNC: 92 MG/DL — SIGNIFICANT CHANGE UP (ref 70–99)
HCT VFR BLD CALC: 36.2 % — SIGNIFICANT CHANGE UP (ref 34.5–45)
HGB BLD-MCNC: 12.5 G/DL — SIGNIFICANT CHANGE UP (ref 11.5–15.5)
IMM GRANULOCYTES NFR BLD AUTO: 1.4 % — HIGH (ref 0–0.9)
LYMPHOCYTES # BLD AUTO: 0.92 K/UL — LOW (ref 1–3.3)
LYMPHOCYTES # BLD AUTO: 44.4 % — HIGH (ref 13–44)
MAGNESIUM SERPL-MCNC: 1.4 MG/DL — LOW (ref 1.6–2.6)
MCHC RBC-ENTMCNC: 30.7 PG — SIGNIFICANT CHANGE UP (ref 27–34)
MCHC RBC-ENTMCNC: 34.5 G/DL — SIGNIFICANT CHANGE UP (ref 32–36)
MCV RBC AUTO: 88.9 FL — SIGNIFICANT CHANGE UP (ref 80–100)
MONOCYTES # BLD AUTO: 0.3 K/UL — SIGNIFICANT CHANGE UP (ref 0–0.9)
MONOCYTES NFR BLD AUTO: 14.5 % — HIGH (ref 2–14)
NEUTROPHILS # BLD AUTO: 0.77 K/UL — LOW (ref 1.8–7.4)
NEUTROPHILS NFR BLD AUTO: 37.3 % — LOW (ref 43–77)
NRBC BLD AUTO-RTO: 0 /100 WBCS — SIGNIFICANT CHANGE UP (ref 0–0)
PLATELET # BLD AUTO: 135 K/UL — LOW (ref 150–400)
POTASSIUM SERPL-MCNC: 2.6 MMOL/L — CRITICAL LOW (ref 3.5–5.3)
POTASSIUM SERPL-MCNC: 3.8 MMOL/L — SIGNIFICANT CHANGE UP (ref 3.5–5.3)
POTASSIUM SERPL-SCNC: 2.6 MMOL/L — CRITICAL LOW (ref 3.5–5.3)
POTASSIUM SERPL-SCNC: 3.8 MMOL/L — SIGNIFICANT CHANGE UP (ref 3.5–5.3)
RBC # BLD: 4.07 M/UL — SIGNIFICANT CHANGE UP (ref 3.8–5.2)
RBC # FLD: 13.5 % — SIGNIFICANT CHANGE UP (ref 10.3–14.5)
SODIUM SERPL-SCNC: 138 MMOL/L — SIGNIFICANT CHANGE UP (ref 135–145)
WBC # BLD: 2.07 K/UL — LOW (ref 3.8–10.5)
WBC # FLD AUTO: 2.07 K/UL — LOW (ref 3.8–10.5)

## 2025-03-11 PROCEDURE — 99232 SBSQ HOSP IP/OBS MODERATE 35: CPT

## 2025-03-11 RX ADMIN — Medication 100 MILLIEQUIVALENT(S): at 09:18

## 2025-03-11 RX ADMIN — CEFTRIAXONE 100 MILLIGRAM(S): 500 INJECTION, POWDER, FOR SOLUTION INTRAMUSCULAR; INTRAVENOUS at 18:57

## 2025-03-11 RX ADMIN — SODIUM CHLORIDE 100 MILLILITER(S): 9 INJECTION, SOLUTION INTRAVENOUS at 04:36

## 2025-03-11 RX ADMIN — SODIUM CHLORIDE 100 MILLILITER(S): 9 INJECTION, SOLUTION INTRAVENOUS at 23:39

## 2025-03-11 RX ADMIN — Medication 100 MILLIEQUIVALENT(S): at 10:07

## 2025-03-11 RX ADMIN — Medication 100 MILLIEQUIVALENT(S): at 11:30

## 2025-03-11 RX ADMIN — Medication 40 MILLIEQUIVALENT(S): at 09:18

## 2025-03-11 NOTE — PROGRESS NOTE ADULT - ASSESSMENT
Chloe Blackmon is an 84 year old female with PMHx of HTN, paranoid schizophrenia, and dementia who presented to the ED on 3/8/25 for complaints of inability to care for herself and admitted for acute metabolic encephalopathy secondary to UTI.    ##Acute metabolic encephalopathy secondary to UTI  Son reports patient with declining mentation since he last saw her two weeks ago  Baseline mentation is AAO x 2 to person and place, now only AAO x 1 to person only  U/A with proteinuria, ketonuria, large bilirubin, small leuks, positive nitrites, WBC 10, RBC 30, moderate bacteria, squamous epithelial cells present, glucosuria, COVID/influenza/RSV negative  CT head without acute intracranial findings, CXR without infectious etiology but with moderate-sized hiatal hernia. S/p ceftriaxone 1 g IV in the ED. B12 elevated. UCx normal filippo. TSH WNL RPR negative.   - Ceftriaxone 1 g IV continued for now  - Pending MR Brain   - Reorient PRN, avoid sedating meds    #PATRICIO vs. elevated creatinine, suspect etiology prerenal secondary to decreased PO intake  BUN 25 and Cr 1.32 on admission, improved to 1.11.   S/p  cc bolus in the ED  Avoid nephrotoxins, renally dose meds  Encourage PO hydration  Monitor renal function    #Social admit  Son reports patient with bouts of paranoid schizophrenia where she will chain up her apartment door so no one can come in. Son did not notice movement on cameras in patient's apartment for a week, used  to cut chains upon arrival to her apartment. Found in bed soiled in urine and feces and apartment is unkempt  Previously declining coming to live with son and daughter-in-law in [place]. Son requesting placement in facility for safety as patient is unable to care for herself. Case management and  consulted for assistance in disposition    #Hiatal hernia, incidental finding  - Will need outpatient f/u, family aware     #HTN, uncontrolled secondary to medication noncompliance  BP as elevated as 198/83, will hold off on initiation of antihypertensives for now given patient refusal to take PO meds, [facility] Script[facility] indicates previously on amlodipine 10 mg    #Dementia: not on any PTA meds, turn and reposition q2    Diet: regular   DVT prophylaxis:   Dispo: Admit, will need placement   Code Status: FC     I have spent a total of 37 minutes to prepare to see the patient, obtaining and reviewing history, physical examination, explaining the diagnosis, prognosis and treatment plan with the patient/family/caregiver. I also have spent the time ordering studies and testing, interpreting results, medicine reconciliation, IDRs, subspecialty consultation and documentation as above.

## 2025-03-12 LAB
ANION GAP SERPL CALC-SCNC: 4 MMOL/L — LOW (ref 5–17)
BASOPHILS # BLD AUTO: 0.02 K/UL — SIGNIFICANT CHANGE UP (ref 0–0.2)
BASOPHILS NFR BLD AUTO: 1 % — SIGNIFICANT CHANGE UP (ref 0–2)
BUN SERPL-MCNC: 9 MG/DL — SIGNIFICANT CHANGE UP (ref 7–23)
CALCIUM SERPL-MCNC: 9.3 MG/DL — SIGNIFICANT CHANGE UP (ref 8.5–10.1)
CHLORIDE SERPL-SCNC: 100 MMOL/L — SIGNIFICANT CHANGE UP (ref 96–108)
CO2 SERPL-SCNC: 31 MMOL/L — SIGNIFICANT CHANGE UP (ref 22–31)
CREAT SERPL-MCNC: 0.85 MG/DL — SIGNIFICANT CHANGE UP (ref 0.5–1.3)
EGFR: 67 ML/MIN/1.73M2 — SIGNIFICANT CHANGE UP
EGFR: 67 ML/MIN/1.73M2 — SIGNIFICANT CHANGE UP
EOSINOPHIL # BLD AUTO: 0.03 K/UL — SIGNIFICANT CHANGE UP (ref 0–0.5)
EOSINOPHIL NFR BLD AUTO: 1.4 % — SIGNIFICANT CHANGE UP (ref 0–6)
GLUCOSE SERPL-MCNC: 89 MG/DL — SIGNIFICANT CHANGE UP (ref 70–99)
HCT VFR BLD CALC: 39.1 % — SIGNIFICANT CHANGE UP (ref 34.5–45)
HGB BLD-MCNC: 13.2 G/DL — SIGNIFICANT CHANGE UP (ref 11.5–15.5)
IMM GRANULOCYTES NFR BLD AUTO: 0.5 % — SIGNIFICANT CHANGE UP (ref 0–0.9)
LYMPHOCYTES # BLD AUTO: 0.7 K/UL — LOW (ref 1–3.3)
LYMPHOCYTES # BLD AUTO: 33.3 % — SIGNIFICANT CHANGE UP (ref 13–44)
MAGNESIUM SERPL-MCNC: 1.5 MG/DL — LOW (ref 1.6–2.6)
MCHC RBC-ENTMCNC: 30.3 PG — SIGNIFICANT CHANGE UP (ref 27–34)
MCHC RBC-ENTMCNC: 33.8 G/DL — SIGNIFICANT CHANGE UP (ref 32–36)
MCV RBC AUTO: 89.9 FL — SIGNIFICANT CHANGE UP (ref 80–100)
MONOCYTES # BLD AUTO: 0.25 K/UL — SIGNIFICANT CHANGE UP (ref 0–0.9)
MONOCYTES NFR BLD AUTO: 11.9 % — SIGNIFICANT CHANGE UP (ref 2–14)
NEUTROPHILS # BLD AUTO: 1.09 K/UL — LOW (ref 1.8–7.4)
NEUTROPHILS NFR BLD AUTO: 51.9 % — SIGNIFICANT CHANGE UP (ref 43–77)
NRBC BLD AUTO-RTO: 0 /100 WBCS — SIGNIFICANT CHANGE UP (ref 0–0)
PLATELET # BLD AUTO: 131 K/UL — LOW (ref 150–400)
POTASSIUM SERPL-MCNC: 3.5 MMOL/L — SIGNIFICANT CHANGE UP (ref 3.5–5.3)
POTASSIUM SERPL-SCNC: 3.5 MMOL/L — SIGNIFICANT CHANGE UP (ref 3.5–5.3)
RBC # BLD: 4.35 M/UL — SIGNIFICANT CHANGE UP (ref 3.8–5.2)
RBC # FLD: 13.5 % — SIGNIFICANT CHANGE UP (ref 10.3–14.5)
SODIUM SERPL-SCNC: 135 MMOL/L — SIGNIFICANT CHANGE UP (ref 135–145)
WBC # BLD: 2.1 K/UL — LOW (ref 3.8–10.5)
WBC # FLD AUTO: 2.1 K/UL — LOW (ref 3.8–10.5)

## 2025-03-12 PROCEDURE — 99222 1ST HOSP IP/OBS MODERATE 55: CPT

## 2025-03-12 PROCEDURE — 70551 MRI BRAIN STEM W/O DYE: CPT | Mod: 26

## 2025-03-12 PROCEDURE — 99233 SBSQ HOSP IP/OBS HIGH 50: CPT

## 2025-03-12 PROCEDURE — 76642 ULTRASOUND BREAST LIMITED: CPT | Mod: 26,LT

## 2025-03-12 RX ORDER — OLANZAPINE 10 MG/1
5 TABLET ORAL ONCE
Refills: 0 | Status: COMPLETED | OUTPATIENT
Start: 2025-03-12 | End: 2025-03-12

## 2025-03-12 RX ORDER — HALOPERIDOL 10 MG/1
2 TABLET ORAL ONCE
Refills: 0 | Status: DISCONTINUED | OUTPATIENT
Start: 2025-03-12 | End: 2025-03-12

## 2025-03-12 RX ORDER — ENOXAPARIN SODIUM 100 MG/ML
40 INJECTION SUBCUTANEOUS EVERY 24 HOURS
Refills: 0 | Status: DISCONTINUED | OUTPATIENT
Start: 2025-03-12 | End: 2025-03-25

## 2025-03-12 RX ORDER — RISPERIDONE 4 MG
0.25 TABLET ORAL
Refills: 0 | Status: DISCONTINUED | OUTPATIENT
Start: 2025-03-13 | End: 2025-03-25

## 2025-03-12 RX ORDER — HALOPERIDOL 10 MG/1
2 TABLET ORAL ONCE
Refills: 0 | Status: COMPLETED | OUTPATIENT
Start: 2025-03-12 | End: 2025-03-13

## 2025-03-12 RX ORDER — MAGNESIUM SULFATE 500 MG/ML
1 SYRINGE (ML) INJECTION ONCE
Refills: 0 | Status: COMPLETED | OUTPATIENT
Start: 2025-03-12 | End: 2025-03-12

## 2025-03-12 RX ADMIN — SODIUM CHLORIDE 100 MILLILITER(S): 9 INJECTION, SOLUTION INTRAVENOUS at 10:44

## 2025-03-12 RX ADMIN — Medication 3 MILLIGRAM(S): at 21:26

## 2025-03-12 RX ADMIN — ENOXAPARIN SODIUM 40 MILLIGRAM(S): 100 INJECTION SUBCUTANEOUS at 21:56

## 2025-03-12 RX ADMIN — OLANZAPINE 5 MILLIGRAM(S): 10 TABLET ORAL at 20:27

## 2025-03-12 RX ADMIN — Medication 100 GRAM(S): at 10:00

## 2025-03-12 NOTE — BH CONSULTATION LIAISON ASSESSMENT NOTE - RISK ASSESSMENT
low risk for intentional, planned out and executed harm to self or others given advanced age, physical frailty and multi-domain cognitive deficits. More likely to unintentionally/accidentally lash out at caretakers during ADL assistance or hurt self by trying to climb out of bed/pulls lines etc secondary to her confused state. Chronic risk factors: advanced age, dementia, lives alone. Protective factors: female gender, no known formal psychiatric hx,  no suicide attempts; no self-injurious behavior; no hx of aggression/violence; no legal issues; no known substance use, motivated for help; articulate; strong family support; access to health services. Acute risk factors identified: worsening dementia

## 2025-03-12 NOTE — BH CONSULTATION LIAISON ASSESSMENT NOTE - CURRENT MEDICATION
MEDICATIONS  (STANDING):  lactated ringers. 1000 milliLiter(s) (100 mL/Hr) IV Continuous <Continuous>  magnesium sulfate  IVPB 1 Gram(s) IV Intermittent once    MEDICATIONS  (PRN):  acetaminophen     Tablet .. 650 milliGRAM(s) Oral every 6 hours PRN Temp greater or equal to 38C (100.4F), Mild Pain (1 - 3)  aluminum hydroxide/magnesium hydroxide/simethicone Suspension 30 milliLiter(s) Oral every 4 hours PRN Dyspepsia  melatonin 3 milliGRAM(s) Oral at bedtime PRN Insomnia  ondansetron Injectable 4 milliGRAM(s) IV Push every 8 hours PRN Nausea and/or Vomiting

## 2025-03-12 NOTE — BH CONSULTATION LIAISON ASSESSMENT NOTE - NSBHCHARTREVIEWLAB_PSY_A_CORE FT
03-12    135  |  100  |  9   ----------------------------<  89  3.5   |  31  |  0.85    Ca    9.3      12 Mar 2025 06:00  Mg     1.5     03-12

## 2025-03-12 NOTE — BH CONSULTATION LIAISON ASSESSMENT NOTE - NSBHCHARTREVIEWINVESTIGATE_PSY_A_CORE FT
CT Chest No Cont (03.10.25 @ 14:42) Skin defect involving the lower aspect of the left breast corresponding to the given history of left breast wound with an associated or contiguous 3.4 x 2.2 cm mass/fluid collection within the lower aspect of the left breast parenchyma

## 2025-03-12 NOTE — BH CONSULTATION LIAISON ASSESSMENT NOTE - SUMMARY
PATIENT DOES NOT HAVE SCHIZOPHRENIA. Patient has Major Neurocognitive Disorder, ie. a Dementia with paranoia.  PATIENT DOES NOT HAVE SCHIZOPHRENIA. Patient has Major Neurocognitive Disorder, ie. a Dementia with paranoia. Work up concerning for breast mass with overlaying wound.

## 2025-03-12 NOTE — BH CONSULTATION LIAISON ASSESSMENT NOTE - MSE OPTIONS
[FreeTextEntry1] : Shingles vaccine given 0.5 cc to left deltoid lot number [id number] expiration date 10/29/2021 Structured MSE

## 2025-03-12 NOTE — PROGRESS NOTE ADULT - ASSESSMENT
Chloe Blackmon is an 84 year old female with PMHx of HTN, paranoid schizophrenia, and dementia who presented to the ED on 3/8/25 for complaints of inability to care for herself and admitted for acute metabolic encephalopathy secondary to UTI.      # L Breast Mass - Suspect malignancy.  Breast U/S not suggestive of abscess, discussed with radiology.  Concern for underlying malignancy.  Wound care with Xeroform.  Will d/w pt's family regarding goals of care, potential for biopsy.    # Dementia with Paranoia - per psychiatry, does not have schizophrenia.  Risperdal prn.  Supportive care.  # PATRICIO - Cr 1.3 on adission, normalized.  D/c IVF  #Hiatal hernia, incidental finding- Will need outpatient f/u, family aware   # HTN - BP controlled.  Continue to monitor    #HTN, uncontrolled secondary to medication noncompliance  BP as elevated as 198/83, will hold off on initiation of antihypertensives for now given patient refusal to take PO meds, [facility] indicates previously on amlodipine 10 mg  # Inpatient DVT Prophylaxis - Lovenox subcut

## 2025-03-12 NOTE — BH CONSULTATION LIAISON ASSESSMENT NOTE - HPI (INCLUDE ILLNESS QUALITY, SEVERITY, DURATION, TIMING, CONTEXT, MODIFYING FACTORS, ASSOCIATED SIGNS AND SYMPTOMS)
85 yo F,  lives alone (family set up cameras to monitor Patient; son lives in [place]), was ambulatory and independent with all ADLs when last seen by her son 2 weeks ago, with Dementia, HTN (noncompliant with medications), large hiatal hernia, hard of hearing (refuses to wear hearing aide), presents emergency room for generalized weakness, family reporting Patient with minimal  movement on the cameras for the past 6 days, found laying in bed with abrasions and covered in urine and feces; also had difficulty getting up out of bed herself. + apartment was reported to be unkempt/unsanitary. For the last 3 weeks, family was not been able to get Patient on the phone but still saw movement on the cameras. Son reports patient has :bouts of paranoid schizophrenia" where she chains up her door so no one can come inside. UA +/+ UTI, CT showing left breast wound with 3.4 x 2.2 cm mass/fluid collection. CT head unremarkable.     ISTOP Reference #: [id number] no record of Patient        83 yo F,  lives alone (family set up cameras to monitor Patient; son lives in [place]), was ambulatory and independent with all ADLs when last seen by her son 2 weeks ago, with Dementia, HTN (noncompliant with medications), large hiatal hernia, hard of hearing (refuses to wear hearing aide), presents emergency room for generalized weakness, family reporting Patient with minimal  movement on the cameras for the past 6 days, found laying in bed with abrasions and covered in urine and feces; also had difficulty getting up out of bed herself. + apartment was reported to be unkempt/unsanitary. For the last 3 weeks, family was not been able to get Patient on the phone but still saw movement on the cameras. Son reports patient has :bouts of paranoid schizophrenia" where she chains up her door so no one can come inside. UA +/+ UTI, CT showing left breast wound with 3.4 x 2.2 cm mass/fluid collection. CT head unremarkable. MR Head No Cont scattered periventricular and subcortical white matter disease. US of breast with findings are highly concerning for breast mass with associated ulceration    EXAM: awake, alert, sitting outside room in a chair looking around, at times talking to self is a disorganized matter and other times to staff. Poor historian, suspicious, somewhat irritable and does not want to answer questions. declined food drink stating 'I am going to be going home." Not able to engage enough to even start an MMSE.             ISTOP Reference #: [id number] no record of Patient

## 2025-03-12 NOTE — BH CONSULTATION LIAISON ASSESSMENT NOTE - NSBHCONSULTRECOMMENDOTHER_PSY_A_CORE FT
- GOC with family recommended especially regarding further work-up of breast mass  - can start risperdal 0.25mg PO bid for paranoia, irritability due to dementia. QTc within normal limits

## 2025-03-12 NOTE — BH CONSULTATION LIAISON ASSESSMENT NOTE - NSBHCHARTREVIEWVS_PSY_A_CORE FT
Vital Signs Last 24 Hrs  T(C): 36.7 (12 Mar 2025 10:58), Max: 36.9 (11 Mar 2025 23:59)  T(F): 98 (12 Mar 2025 10:58), Max: 98.4 (11 Mar 2025 23:59)  HR: 101 (12 Mar 2025 10:58) (59 - 101)  BP: 111/71 (12 Mar 2025 10:58) (100/62 - 166/85)  BP(mean): --  RR: 18 (12 Mar 2025 10:58) (17 - 18)  SpO2: 96% (12 Mar 2025 10:58) (94% - 98%)    Parameters below as of 12 Mar 2025 10:58  Patient On (Oxygen Delivery Method): room air

## 2025-03-12 NOTE — BH CONSULTATION LIAISON ASSESSMENT NOTE - OTHER
limited  dementia  does not seem to be responding to internal stimuli; talking to self seems to be a one-way conversation  appropriate  superficially cooperative  "why?"  tenuous

## 2025-03-13 LAB
HCT VFR BLD CALC: 36.4 % — SIGNIFICANT CHANGE UP (ref 34.5–45)
HGB BLD-MCNC: 12.5 G/DL — SIGNIFICANT CHANGE UP (ref 11.5–15.5)
MCHC RBC-ENTMCNC: 30.8 PG — SIGNIFICANT CHANGE UP (ref 27–34)
MCHC RBC-ENTMCNC: 34.3 G/DL — SIGNIFICANT CHANGE UP (ref 32–36)
MCV RBC AUTO: 89.7 FL — SIGNIFICANT CHANGE UP (ref 80–100)
NRBC BLD AUTO-RTO: 0 /100 WBCS — SIGNIFICANT CHANGE UP (ref 0–0)
PLATELET # BLD AUTO: 123 K/UL — LOW (ref 150–400)
RBC # BLD: 4.06 M/UL — SIGNIFICANT CHANGE UP (ref 3.8–5.2)
RBC # FLD: 13.8 % — SIGNIFICANT CHANGE UP (ref 10.3–14.5)
WBC # BLD: 2.36 K/UL — LOW (ref 3.8–10.5)
WBC # FLD AUTO: 2.36 K/UL — LOW (ref 3.8–10.5)

## 2025-03-13 PROCEDURE — 99233 SBSQ HOSP IP/OBS HIGH 50: CPT

## 2025-03-13 PROCEDURE — 99231 SBSQ HOSP IP/OBS SF/LOW 25: CPT

## 2025-03-13 RX ADMIN — HALOPERIDOL 2 MILLIGRAM(S): 10 TABLET ORAL at 00:16

## 2025-03-13 RX ADMIN — Medication 0.25 MILLIGRAM(S): at 05:24

## 2025-03-13 RX ADMIN — Medication 0.25 MILLIGRAM(S): at 17:47

## 2025-03-13 RX ADMIN — ENOXAPARIN SODIUM 40 MILLIGRAM(S): 100 INJECTION SUBCUTANEOUS at 23:30

## 2025-03-13 NOTE — PROGRESS NOTE ADULT - ASSESSMENT
Chloe Blackmon is an 84 year old female with PMHx of HTN, paranoid schizophrenia, and dementia who presented to the ED on 3/8/25 for complaints of inability to care for herself and admitted for acute metabolic encephalopathy secondary to UTI.      # L Breast Mass - High suspicion for malignancy.  Wound care with Xeroform.  Options including biopsy with further possible treatment including ? chemo / RT / surgery d/w son.  Given pt's periodic agitation and known h/o paranoia, pt would be a poor candidate to tolerate further evaluation and treatment.  Son to d/w patient.    # Dementia with Paranoia, ? Paranoid Schizophrenia - per psychiatry, does not have schizophrenia.  Risperdal prn.  Pt's son reports pt with 2 psychiatric hospitalizations over past 20y for "some type of paranoia".    # PATRICIO - Cr 1.3 on adission, normalized.  D/c IVF  #Hiatal hernia, incidental finding- Will need outpatient f/u, family aware   # HTN - BP controlled.  Continue to monitor  # Inpatient DVT Prophylaxis - Lovenox subcut    Pt's son to d/w patient.  I have arranged a family meeting at 1-2pm on 3/14 to discuss GOC.  Time spent by me managing the patient including but not limited to reviewing the chart, discussion with consultants, the IDR team (nurse//care manager/ACP), physical exam and assessment and plan is 52 mins  Chloe Blackmon is an 84 year old female with PMHx of HTN, paranoid schizophrenia, and dementia who presented to the ED on 3/8/25 for complaints of inability to care for herself and admitted for acute metabolic encephalopathy secondary to UTI.      # L Breast Mass - High suspicion for malignancy.  Wound care with Xeroform.  Options including biopsy with further possible treatment including ? chemo / RT / surgery d/w son.  Given pt's periodic agitation and known h/o paranoia, pt would be a poor candidate to tolerate further evaluation and treatment.  Son to d/w patient.    # Dementia with Paranoia, ? Paranoid Schizophrenia - per psychiatry, does not have schizophrenia.  Risperdal prn.  Pt's son reports pt with 2 psychiatric hospitalizations over past 20y for "some type of paranoia".    # PATRICIO - Cr 1.3 on adission, normalized.  D/c IVF  #Hiatal hernia, incidental finding- Will need outpatient f/u, family aware   # HTN - BP controlled.  Continue to monitor  # Inpatient DVT Prophylaxis - Lovenox subcut    I d/w pt's son Carlos at   I have arranged a family meeting at 1-2pm on 3/14 to discuss GOC.  Time spent by me managing the patient including but not limited to reviewing the chart, discussion with consultants, the IDR team (nurse//care manager/ACP), physical exam and assessment and plan is 52 mins

## 2025-03-14 LAB
ANION GAP SERPL CALC-SCNC: 4 MMOL/L — LOW (ref 5–17)
BUN SERPL-MCNC: 13 MG/DL — SIGNIFICANT CHANGE UP (ref 7–23)
CALCIUM SERPL-MCNC: 9.1 MG/DL — SIGNIFICANT CHANGE UP (ref 8.5–10.1)
CHLORIDE SERPL-SCNC: 104 MMOL/L — SIGNIFICANT CHANGE UP (ref 96–108)
CO2 SERPL-SCNC: 33 MMOL/L — HIGH (ref 22–31)
CREAT SERPL-MCNC: 1.04 MG/DL — SIGNIFICANT CHANGE UP (ref 0.5–1.3)
EGFR: 53 ML/MIN/1.73M2 — LOW
EGFR: 53 ML/MIN/1.73M2 — LOW
GLUCOSE BLDC GLUCOMTR-MCNC: 135 MG/DL — HIGH (ref 70–99)
GLUCOSE SERPL-MCNC: 133 MG/DL — HIGH (ref 70–99)
HCT VFR BLD CALC: 35.4 % — SIGNIFICANT CHANGE UP (ref 34.5–45)
HGB BLD-MCNC: 11.7 G/DL — SIGNIFICANT CHANGE UP (ref 11.5–15.5)
MAGNESIUM SERPL-MCNC: 1.9 MG/DL — SIGNIFICANT CHANGE UP (ref 1.6–2.6)
MCHC RBC-ENTMCNC: 30.5 PG — SIGNIFICANT CHANGE UP (ref 27–34)
MCHC RBC-ENTMCNC: 33.1 G/DL — SIGNIFICANT CHANGE UP (ref 32–36)
MCV RBC AUTO: 92.4 FL — SIGNIFICANT CHANGE UP (ref 80–100)
NRBC BLD AUTO-RTO: 0 /100 WBCS — SIGNIFICANT CHANGE UP (ref 0–0)
PHOSPHATE SERPL-MCNC: 3.3 MG/DL — SIGNIFICANT CHANGE UP (ref 2.5–4.5)
PLATELET # BLD AUTO: 146 K/UL — LOW (ref 150–400)
POTASSIUM SERPL-MCNC: 2.9 MMOL/L — CRITICAL LOW (ref 3.5–5.3)
POTASSIUM SERPL-SCNC: 2.9 MMOL/L — CRITICAL LOW (ref 3.5–5.3)
RBC # BLD: 3.83 M/UL — SIGNIFICANT CHANGE UP (ref 3.8–5.2)
RBC # FLD: 14.5 % — SIGNIFICANT CHANGE UP (ref 10.3–14.5)
SODIUM SERPL-SCNC: 141 MMOL/L — SIGNIFICANT CHANGE UP (ref 135–145)
WBC # BLD: 2.46 K/UL — LOW (ref 3.8–10.5)
WBC # FLD AUTO: 2.46 K/UL — LOW (ref 3.8–10.5)

## 2025-03-14 PROCEDURE — 99291 CRITICAL CARE FIRST HOUR: CPT | Mod: FS

## 2025-03-14 PROCEDURE — 99233 SBSQ HOSP IP/OBS HIGH 50: CPT

## 2025-03-14 PROCEDURE — 70450 CT HEAD/BRAIN W/O DYE: CPT | Mod: 26

## 2025-03-14 PROCEDURE — 99231 SBSQ HOSP IP/OBS SF/LOW 25: CPT

## 2025-03-14 RX ORDER — CEFTRIAXONE 500 MG/1
1000 INJECTION, POWDER, FOR SOLUTION INTRAMUSCULAR; INTRAVENOUS ONCE
Refills: 0 | Status: COMPLETED | OUTPATIENT
Start: 2025-03-14 | End: 2025-03-14

## 2025-03-14 RX ORDER — SODIUM CHLORIDE 9 G/1000ML
1000 INJECTION, SOLUTION INTRAVENOUS
Refills: 0 | Status: DISCONTINUED | OUTPATIENT
Start: 2025-03-14 | End: 2025-03-20

## 2025-03-14 RX ORDER — CEFTRIAXONE 500 MG/1
1000 INJECTION, POWDER, FOR SOLUTION INTRAMUSCULAR; INTRAVENOUS EVERY 24 HOURS
Refills: 0 | Status: COMPLETED | OUTPATIENT
Start: 2025-03-15 | End: 2025-03-17

## 2025-03-14 RX ORDER — CEFTRIAXONE 500 MG/1
INJECTION, POWDER, FOR SOLUTION INTRAMUSCULAR; INTRAVENOUS
Refills: 0 | Status: COMPLETED | OUTPATIENT
Start: 2025-03-14 | End: 2025-03-18

## 2025-03-14 RX ADMIN — ENOXAPARIN SODIUM 40 MILLIGRAM(S): 100 INJECTION SUBCUTANEOUS at 22:25

## 2025-03-14 RX ADMIN — SODIUM CHLORIDE 75 MILLILITER(S): 9 INJECTION, SOLUTION INTRAVENOUS at 22:25

## 2025-03-14 RX ADMIN — Medication 0.25 MILLIGRAM(S): at 05:43

## 2025-03-14 RX ADMIN — Medication 100 MILLIEQUIVALENT(S): at 14:03

## 2025-03-14 RX ADMIN — Medication 100 MILLIEQUIVALENT(S): at 16:19

## 2025-03-14 RX ADMIN — Medication 40 MILLIEQUIVALENT(S): at 18:11

## 2025-03-14 RX ADMIN — Medication 40 MILLIEQUIVALENT(S): at 16:57

## 2025-03-14 RX ADMIN — CEFTRIAXONE 100 MILLIGRAM(S): 500 INJECTION, POWDER, FOR SOLUTION INTRAMUSCULAR; INTRAVENOUS at 16:17

## 2025-03-14 RX ADMIN — SODIUM CHLORIDE 75 MILLILITER(S): 9 INJECTION, SOLUTION INTRAVENOUS at 12:20

## 2025-03-14 RX ADMIN — Medication 0.25 MILLIGRAM(S): at 18:11

## 2025-03-14 RX ADMIN — Medication 100 MILLIEQUIVALENT(S): at 14:39

## 2025-03-14 RX ADMIN — Medication 3 MILLIGRAM(S): at 22:25

## 2025-03-14 NOTE — PROGRESS NOTE ADULT - CONVERSATION DETAILS
I d/w pt's son Carlos at   I have arranged a family meeting at 1-2pm on 3/14 to discuss GOC.  Goals of care - I had a lengthy conversation with pt's son Carlos.    We discussed the comorbid conditions, hospital care, and prognosis.  We also discussed advanced directives - made aware of limited prognosis if patient were to be intubated or resuscitated, in consideration of age and comorbid conditions.  He stated he wished to discuss advanced directives further with family.  However, he states family is aware of her h/o paranoia and the challenges this poses to further workup and treatment of likely breast cancer.  Carlos and family wish to stop any further evaluation / treatment, acknowledging the risks, benefits and alternatives.          Total time spent on patient care discussion = 20 minutes.

## 2025-03-14 NOTE — BH CONSULTATION LIAISON PROGRESS NOTE - NSBHCHARTREVIEWVS_PSY_A_CORE FT
Vital Signs Last 24 Hrs  T(C): 36.3 (13 Mar 2025 10:53), Max: 36.7 (12 Mar 2025 23:27)  T(F): 97.3 (13 Mar 2025 10:53), Max: 98.1 (12 Mar 2025 23:27)  HR: 63 (13 Mar 2025 10:53) (63 - 115)  BP: 149/76 (13 Mar 2025 10:53) (111/48 - 149/76)  BP(mean): --  RR: 16 (13 Mar 2025 10:53) (16 - 18)  SpO2: 99% (13 Mar 2025 10:53) (97% - 99%)    Parameters below as of 12 Mar 2025 17:01  Patient On (Oxygen Delivery Method): room air    
Vital Signs Last 24 Hrs  T(C): 36.8 (14 Mar 2025 13:45), Max: 37.2 (13 Mar 2025 23:29)  T(F): 98.2 (14 Mar 2025 13:45), Max: 98.9 (13 Mar 2025 23:29)  HR: 86 (14 Mar 2025 13:45) (67 - 96)  BP: 128/69 (14 Mar 2025 13:53) (68/46 - 129/72)  BP(mean): --  RR: 18 (14 Mar 2025 13:45) (17 - 18)  SpO2: 98% (14 Mar 2025 13:45) (96% - 98%)    Parameters below as of 14 Mar 2025 13:45  Patient On (Oxygen Delivery Method): room air

## 2025-03-14 NOTE — PROGRESS NOTE ADULT - ASSESSMENT
Chloe Blackmon is an 84 year old female with PMHx of HTN, paranoid schizophrenia, and dementia who presented to the ED on 3/8/25 for complaints of inability to care for herself and admitted for acute metabolic encephalopathy secondary to UTI.    # Hypotension / ? UTI- UA on admission noted elevated.  IVF.  BP normalized.  Pt asymptomatic.  Short course of Abx.    # L Breast Mass - High suspicion for malignancy.  Wound care with Xeroform.  Options including biopsy with further possible treatment including ? chemo / RT / surgery d/w son.  Given pt's periodic agitation and known h/o paranoia, pt would be a poor candidate to tolerate further evaluation and treatment.  Son in agreement, wishes to defer any further workup and treatment.  States he has discussed extensively with family.  # Dementia with Paranoia, ? Paranoid Schizophrenia - per psychiatry, does not have schizophrenia.  Risperdal prn.  Pt's son reports pt with 2 psychiatric hospitalizations over past 20y for "some type of paranoia".    # PATRICIO - Cr 1.3 on adission, normalized.  D/c IVF  #Hiatal hernia, incidental finding- Will need outpatient f/u, family aware   # HTN - BP controlled.  Continue to monitor  # Inpatient DVT Prophylaxis - Lovenox subcut    Time spent by me managing the patient including but not limited to reviewing the chart, discussion with consultants, the IDR team (nurse//care manager/ACP), physical exam and assessment and plan is 52 mins

## 2025-03-14 NOTE — BH CONSULTATION LIAISON PROGRESS NOTE - NSBHCHARTREVIEWLAB_PSY_A_CORE FT
03-12    135  |  100  |  9   ----------------------------<  89  3.5   |  31  |  0.85    Ca    9.3      12 Mar 2025 06:00  Mg     1.5     03-12      
03-14    141  |  104  |  13  ----------------------------<  133[H]  2.9[LL]   |  33[H]  |  1.04    Ca    9.1      14 Mar 2025 10:35  Phos  3.3     03-14  Mg     1.9     03-14

## 2025-03-14 NOTE — BH CONSULTATION LIAISON PROGRESS NOTE - NSBHCONSULTRECOMMENDOTHER_PSY_A_CORE FT
3/12/25: GOC with family recommended especially regarding further work-up of breast mass  - can start risperdal 0.25mg PO bid for paranoia, irritability due to dementia. QTc within normal limits    3/13/25: continue risperdal 0.25mg PO bid for paranoia, irritability due to dementia  - no capacity to make her medical decisions   3/14/25: continue risperdal 0.25mg PO bid for paranoia, irritability due to dementia; seems ot be at lowest effective dose at this time   - can continue risperdal on discharge ; can be managed by outpatient geriatrician, neuro or psych for dementia 
3/12/25: GOC with family recommended especially regarding further work-up of breast mass  - can start risperdal 0.25mg PO bid for paranoia, irritability due to dementia. QTc within normal limits    3/13/25: continue risperdal 0.25mg PO bid for paranoia, irritability due to dementia  - no capacity to make her medical decisions

## 2025-03-14 NOTE — RAPID RESPONSE TEAM SUMMARY - NSADDTLFINDINGSRRT_GEN_ALL_CORE
Vitals at 13:46   BP: 68/41, HR: 85, RR: 18, Spo2 - 97%, Blood sugar 135, LOC: Lethargic  Vitals after NaCl 0.9% Bolus   BP: 134/67, HR: 150  Repeat Vitals  BP: 128/69 HR: 108    Electrolyte abnormalities currently being repleted  K - 2.9  Mag & Phos are pending Vitals at 13:46   BP: 68/41, HR: 85, RR: 18, Spo2 - 97%, Blood sugar 135, LOC: Lethargic  Vitals after NaCl 0.9% Bolus   BP: 134/67, HR: 150  Repeat Vitals  BP: 128/69 HR: 108    Electrolyte abnormalities currently being repleted  K - 2.9  Mag & Phos are WNL

## 2025-03-14 NOTE — BH CONSULTATION LIAISON PROGRESS NOTE - NSBHCHARTREVIEWINVESTIGATE_PSY_A_CORE FT
CT Chest No Cont (03.10.25 @ 14:42) Skin defect involving the lower aspect of the left breast corresponding to the given history of left breast wound with an associated or contiguous 3.4 x 2.2 cm mass/fluid collection within the lower aspect of the left breast parenchyma
CT Chest No Cont (03.10.25 @ 14:42) Skin defect involving the lower aspect of the left breast corresponding to the given history of left breast wound with an associated or contiguous 3.4 x 2.2 cm mass/fluid collection within the lower aspect of the left breast parenchyma

## 2025-03-14 NOTE — BH CONSULTATION LIAISON PROGRESS NOTE - NSBHMSEHYG_PSY_A_CORE
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 She received oral contraceptives for approximately 14 years and did not receive hormonal manipulation. Patient has a maternal cousin had breast cancer. Patient has had no prior breast biopsies. Patient found a palpable left breast mass as well as a left axillary mass. Patient had a bloody nipple discharge which been present for approximately 6 months. On 6/20/2012, a mammogram was performed showing a subareolar mass consistent with malignancy. Bilateral breast ultrasound revealed an ill-defined subareolar mass measuring 2.8-2.5 cm. There is a left axillary mass measuring 1.7 cm with suspicion of extracapsular extension. There is no evidence of disease in the right breast. On 7/9/2012 patient underwent a left breast biopsy and placement of marker clip. Left breast biopsy revealed an infiltrating lobular carcinoma grade 2 with focal ductal carcinoma in situ, solid pattern. A fine-needle biopsy of the left lymph node was consistent with metastatic carcinoma. Breast tumor profile revealed ER: 100%; MO: 98%; HER-2/maynor 2+; FISH: Unamplified; Ki-67: 71%; P 53 1%; DNA aneuploidy. A MRI of the breast were performed body multiple abnormal enhancing masses within  the left breast. There appears to be anterior retraction of the pectoralis muscle with no direct extension. There is a moderate to large, layering left pleural effusion appreciated. The right breast reveals 3 moderately enlarged lymph nodes in the posterior lateral aspect the right breast. These have fatty hilum but followup is recommended. Patient is undergone systemic studies including, on 8/2/2012, a CT scan of the brain showing atrophy. a CT scan that shows showing a small to moderate left pleural effusion with 3 subcentimeter nodule densities in the left lung.   She completed 4 cycles of neoadjuvant treatment with dose dense Adriamycin and Cytoxan. She had a mammogram which showed a significant reduction in the size of her left breast lesion. There is no axillary adenopathy noted. She has had an ultrasound revealing the  lesion reducing from 2.4 cm to 1 cm. Patient underwent a left mastectomy on 03/19/2013 finding negative invasive cancer, negative nodes. A 5% DCIS was noted. The patient has declined hormonal therapy. She did not need radiation therapy.  November 2014, she began to have evidence of lytic bone lesions at T5T6, frontal disease, an 8 mm lucency in the central frontal area of  the skull but nothing intracranial. Bone scan revealed only vertex tracer activity but bilateral ribs and thoracic spine, and other lesions in  her pelvis. At that time, she was started on letrozole since November 2014. She is taking Xgeva. She had progression found in bone in Feb 2015 on scan. She was started on Faslodex, Ibrance and continued xgeva.         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 B) Foci of ductal carcinoma in situ, solid pattern. C) Greatest dimension of the  invasive carcinoma is 15.8mm.         3/19/2013 Surgery     Left Mastectomy w/ Axillary Tail: Focal residual ducatal carcinoma in situ (DCIS) 12 lymph nodes negative for metastatic carcinoma.         10/20/2014 Biopsy     Peritoneum Biopsy: Final Diagnosis: Malignant Cell Neoplasm.           Breast cancer metastasized to bone, unspecified laterality (CMS/HCC)    10/15/2012 -  Other Event     Left mammogram:  Impression:  1. Decreasing spiculation and density in the retroareolar left breast, near a previous biopsy.   2. Definite left axillary lesion is not appreciated.          2/5/2013 -  Other Event     Diagnostic Mammo:  Comparison is made to 10/15/2012 and 6/20/2012  The spiculated mass at 12:00 behind the nipple is nearly resolved.   Impression:  1. The mass on the left side is less apparent after receiving chemotherapy. There has been a good response to chmotherapy on the left side.   2. No suspicious abnormality is seen in the right breast to account for the new palable abnormality at the 4-5:00 position.          3/19/2013 Surgery     Breast Biopsy:  Final Diagnosis:  A. Breast, left mastectomy with axillary tail  1. Focal residual ductal carcinoma in situ (DICS) (less than 5% of tumor bed).  2. Negative for residual invasive carcinoma.   3. 12 Lymph Nodes, negative for metastatic carcinoma (0/12) focally, some lymph nodes demonstrate fibrosis/treatment effect.  B. Skin and soft tissue, left advancement flap:  1. Benign skin and subcutis.   2. Negative for carcinoma.          6/20/2013 -  Other Event     Diagnostic Mammo [facility]:  1. A subareolar mass is consistent with malignancy. Additional involement extends inferiorly sonographically and superolaterally mammograhically as demonstrated by calcifications. The mass measures approx 2.8 cm sonographically, but the involved region is likely much larger including an intraductal componet. The left axillary lymph nodes are also involved.   2. Recommened  ultrasound guided biopsy of the left subareolar mass and left axillary lymph node.   3. Breast MRI could also further define multicentric disease on the left.   4. Clear suspicious finding on the right.  Recommend ongoing clinical follow up of palpable foci.          6/13/2014 -  Other Event     Diagnostic Mammo:  IMPRESSION:  1. History of left breast cancer and mastectomy. Stable benign right breast mammograms.          10/20/2014 Surgery     Final Diagnosis:  Biopsies, pertoneum:  Metastatic carcinoma, morphologically compatible with metastatic mammary carcinoma.          6/15/2015 -  Other Event     Diagnostic Mammo:  IMPRESSION:   1. Negative x-ray report, should not delay biopsy if a dominat or clinically suspicious mass is present.          7/5/2016 -  Other Event     Diagnositic mammogram:  Impression:   Stable right mamogram with no radiographic findings suspicious for malignancy. Recommend continued screening mammography per screening guidelines unless otherwise earlier clinically indicated.          9/18/2016 Initial Diagnosis     Secondary malignant neoplasm of bone and bone marrow                INTERVAL HISTORY  Patient ID: Heavenly Yanes is a 75 y.o. year old female Cancer Staging  Stage IV (T2, N1, M1)  --complaining of dyspnea on exersion since last Tuesday. She feels like she ran a mile  2/14/19: cxr showed pneumonia. Already on levofloxacin. Coughing not worse, she did not run temp.  Overall feels weak.  -restarted the appetite suppressant and is starting to eat more but not a whole lot  --2/28/19: CT c/ap with some response to therapy. New left chest inflammatory lesion to be followed ion next imaging. Bone scan unremarkable for progression.  4/25/19: she is much better since being off therapy for 2 weeks. COugh is improved.   5/23/19: has otits media of the right ear with effusion. This happens off and on for her. She usually gets some medication from pcp. Given her multiple allergies will have  her f/u with pcp with what has worked for her in the past.. Leg edema improved with lasix. Denies sob /cp/n/v/, neuropathy/ no focal weakness.   Rest of ros unremarkable. 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) 9/18/2016     Past Surgical History:   Past Surgical History:   Procedure Laterality Date   • BREAST SURGERY      left breast biopsy and axillary node   • CHOLECYSTECTOMY     • EXTERNAL EAR SURGERY     • MASTECTOMY Left    • PORTACATH PLACEMENT       Social History:   Social History     Socioeconomic History   • Marital status:      Spouse name: Not on file   • Number of children: Not on file   • Years of education: Not on file   • Highest education level: Not on file   Tobacco Use   • Smoking status: Never Smoker   • Smokeless tobacco: Never Used   Substance and Sexual Activity   • Alcohol use: No   • Drug use: No     Family History:   Family History   Problem Relation Age of Onset   • No Known Problems Daughter    • No Known Problems Son    • Other Mother         complications from a concussion from a fall   • Other Father         old age   • No Known Problems Brother    • No Known Problems Daughter        Review of Systems   Constitutional: Positive for activity change, appetite change and fatigue. Negative for unexpected weight change.   HENT: Negative.    Eyes: Negative.    Respiratory: Positive for shortness of breath.    Cardiovascular: Positive for leg swelling.   Gastrointestinal: Negative.    Endocrine: Negative.    Genitourinary: Negative.    Musculoskeletal: Negative.    Skin: Negative.    Neurological: Negative.    Hematological: Negative.     Psychiatric/Behavioral: Negative.         Performance Status:  Asymptomatic    Medications:    Current Outpatient Medications   Medication Sig Dispense Refill   • Calcium-Magnesium-Vitamin D (CALCIUM 1200+D3 PO) Take 1,200 mg by mouth Daily.     • capecitabine (XELODA) 500 MG chemo tablet Take 1 tablet by mouth 2 (Two) Times a Day. For 2 weeks on and 1 week off. 42 tablet 11   • furosemide (LASIX) 20 MG tablet One tab po qd as needed for swelling take with potassium 30 tablet 0   • megestrol (MEGACE) 40 MG tablet Take 1 tablet by mouth 2 (Two) Times a Day. 60 tablet 5   • metoprolol tartrate (LOPRESSOR) 25 MG tablet Take 25 mg by mouth 2 (two) times a day.     • OLANZapine (ZYPREXA) 2.5 MG tablet Take 2.5 mg by mouth every night.     • ondansetron (ZOFRAN) 8 MG tablet Take 1 tablet by mouth Every 8 (Eight) Hours As Needed for Nausea or Vomiting. 30 tablet 5   • potassium chloride (K-DUR,KLOR-CON) 20 MEQ CR tablet Take one tab po bid on days taking Lasix 45 tablet 0   • raNITIdine (ZANTAC) 150 MG tablet Take 150 mg by mouth 2 (Two) Times a Day.     • rivaroxaban (XARELTO) 20 MG tablet Take 1 tablet by mouth Daily. 30 tablet 5   • thyroid 60 MG PO tablet Take 60 mg by mouth daily.       No current facility-administered medications for this visit.      Facility-Administered Medications Ordered in Other Visits   Medication Dose Route Frequency Provider Last Rate Last Dose   • DOCEtaxel 60 mg in sodium chloride 0.9 % 193 mL chemo IVPB  35 mg/m2 (Treatment Plan Adjusted) Intravenous Once Moise Sotelo MD   60 mg at 05/23/19 1108   • famotidine (PEPCID) injection 20 mg  20 mg Intravenous PRN Moise Sotelo MD       • heparin flush (porcine) 100 UNIT/ML injection 500 Units  500 Units Intravenous PRN Joseph Nunez MD   500 Units at 10/26/17 1155   • heparin flush (porcine) 100 UNIT/ML injection 500 Units  500 Units Intravenous Once Moise Sotelo MD       • hydrocortisone sodium  "succinate (Solu-CORTEF) injection 100 mg  100 mg Intravenous PRN Moise Sotelo MD       • meperidine (DEMEROL) injection 25 mg  25 mg Intravenous Q20 Min PRN Moise Sotelo MD       • sodium chloride 0.9 % flush 10 mL  10 mL Intravenous PRN Joseph Nunez MD   10 mL at 10/26/17 1155   • sodium chloride 0.9 % flush 10 mL  10 mL Intravenous PRN Joseph Nunez MD           ALLERGIES:    Allergies   Allergen Reactions   • Benadryl  [Diphenhydramine Hcl (Sleep)] Other (See Comments)   • Codeine    • Diphenhydramine Other (See Comments)     \"Shaky leg syndrome\"  \"Shaky leg syndrome\"  \"Shaky leg syndrome\"   • Heparin Other (See Comments)     Patient states tolerates Heparin flush without problems: Years ago had heparin IV and had a rash   • Other      POPPY SEED   • Penicillins Hives   • Petroleum Jelly [Skin Protectants, Misc.]    • Sulfa Antibiotics    • Sulfur    • Valium [Diazepam]        Objective      Vitals:    05/23/19 0927   BP: 136/72   Pulse: 68   Resp: 16   Temp: 97.9 °F (36.6 °C)   TempSrc: Oral   SpO2: 98%   Weight: 62.3 kg (137 lb 6.4 oz)   Height: 165.1 cm (65\")   PainSc: 0-No pain         Current Status 5/23/2019   ECOG score 1         Physical Examunchanged  General Appearance: Patient is awake, alert, oriented and in no acute distress. Patient is welldeveloped, wellnourished, and appears stated age.  HEENT: Normocephalic. Sclerae clear, conjunctiva pink, extraocular movements intact, pupils, round, reactive to light and  accommodation. Mouth and throat are clear with moist oral mucosa.  NECK: Supple, no jugular venous distention, thyroid not enlarged.  LYMPH: No cervical, supraclavicular, axillary, or inguinal lymphadenopathy.  CHEST: Equal bilateral expansion, AP  diameter normal, resonant percussion note  LUNGS: Good air movement, no rales, rhonchi, rubs or wheezes with auscultation on the right. Diminished on the left.  CARDIO: Regular sinus rhythm, no murmurs, gallops or " rubs.  ABDOMEN: Nondistended, soft, No tenderness, no guarding, no rebound, No hepatosplenomegaly. No abdominal masses. Bowel sounds positive. No hernia  GENITALIA: Not examined.  BREASTS: Not examined.  MUSKEL: No joint swelling, decreased motion, or inflammation  EXTREMS: patrick le  edema,No clubbing, cyanosis, No varicose veins.  NEURO: Grossly nonfocal, Gait is coordinated and smooth, Cognition is preserved.  SKIN: No rashes, no ecchymoses, no petechia.  PSYCH: Oriented to time, place and person. Memory is preserved. Mood and affect appear normal  RECENT LABS:  Lab on 05/23/2019   Component Date Value Ref Range Status   • Glucose 05/23/2019 101* 70 - 100 mg/dL Final   • BUN 05/23/2019 11  5 - 21 mg/dL Final   • Creatinine 05/23/2019 0.83  0.50 - 1.40 mg/dL Final   • Sodium 05/23/2019 135  135 - 145 mmol/L Final   • Potassium 05/23/2019 3.6  3.5 - 5.3 mmol/L Final   • Chloride 05/23/2019 103  98 - 110 mmol/L Final   • CO2 05/23/2019 25.0  24.0 - 31.0 mmol/L Final   • Calcium 05/23/2019 9.3  8.4 - 10.4 mg/dL Final   • Total Protein 05/23/2019 6.5  6.3 - 8.7 g/dL Final   • Albumin 05/23/2019 3.50  3.50 - 5.00 g/dL Final   • ALT (SGPT) 05/23/2019 <15  0 - 54 U/L Final   • AST (SGOT) 05/23/2019 20  7 - 45 U/L Final   • Alkaline Phosphatase 05/23/2019 78  24 - 120 U/L Final   • Total Bilirubin 05/23/2019 0.3  0.1 - 1.0 mg/dL Final   • eGFR Non  Amer 05/23/2019 67  >60 mL/min/1.73 Final   • Globulin 05/23/2019 3.0  gm/dL Final   • A/G Ratio 05/23/2019 1.2  1.1 - 2.5 g/dL Final   • BUN/Creatinine Ratio 05/23/2019 13.3  7.0 - 25.0 Final   • Anion Gap 05/23/2019 7.0  4.0 - 13.0 mmol/L Final   • WBC 05/23/2019 13.94* 4.80 - 10.80 10*3/mm3 Final   • RBC 05/23/2019 3.36* 4.20 - 5.40 10*6/mm3 Final   • Hemoglobin 05/23/2019 11.0* 12.0 - 16.0 g/dL Final   • Hematocrit 05/23/2019 33.5* 37.0 - 47.0 % Final   • MCV 05/23/2019 99.7* 82.0 - 98.0 fL Final   • MCH 05/23/2019 32.7* 28.0 - 32.0 pg Final   • MCHC 05/23/2019 32.8*  33.0 - 36.0 g/dL Final   • RDW 05/23/2019 17.9* 12.0 - 15.0 % Final   • RDW-SD 05/23/2019 63.0* 40.0 - 54.0 fl Final   • MPV 05/23/2019 10.1  6.0 - 12.0 fL Final   • Platelets 05/23/2019 473* 130 - 400 10*3/mm3 Final   • Neutrophil % 05/23/2019 73.0  39.0 - 78.0 % Final   • Lymphocyte % 05/23/2019 17.9  15.0 - 45.0 % Final   • Monocyte % 05/23/2019 7.1  4.0 - 12.0 % Final   • Eosinophil % 05/23/2019 0.4  0.0 - 4.0 % Final   • Basophil % 05/23/2019 0.5  0.0 - 2.0 % Final   • Immature Grans % 05/23/2019 1.1  0.0 - 5.0 % Final   • Neutrophils, Absolute 05/23/2019 10.18* 1.87 - 8.40 10*3/mm3 Final   • Lymphocytes, Absolute 05/23/2019 2.50  0.72 - 4.86 10*3/mm3 Final   • Monocytes, Absolute 05/23/2019 0.99  0.19 - 1.30 10*3/mm3 Final   • Eosinophils, Absolute 05/23/2019 0.05  0.00 - 0.70 10*3/mm3 Final   • Basophils, Absolute 05/23/2019 0.07  0.00 - 0.20 10*3/mm3 Final   • Immature Grans, Absolute 05/23/2019 0.15* 0.00 - 0.05 10*3/mm3 Final   • nRBC 05/23/2019 0.0  0.0 - 0.2 /100 WBC Final   • Extra Tube 05/23/2019 Hold for add-ons.   Final    Auto resulted.       RADIOLOGY:  No results found.         Assessment/Plan  Heavenly Yanes is a 75 y.o. year old female with met breast cancer s/p multiple lines of CMT currently on taxotere weekly and xeloda with good tolerance.    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  --also on xeloda  --CT c/a/p 2/19  showed stable dz. Some new Inflammatory opacities in the LEFT lower lobe and bilateral subpleural interstitial thickening.   -bone scan without evidence of dz progression  --primo taxotere weekly. Pt also on xeloda [id number], then repeat.  4/25/19: therapy postponed 2 weeks ago due to neutropenia. He got Neupogen x1. Labs reviewed with pt wbc 10.68, hg 11.1, [id number]. ANC 7630.  -ANC low 880. Postpone tx. Give neupogen x1. Pt advised to hold xeloda until 2 weeks  5/23/19: labs reviewed with pt wbc 13.94, hg 11.0, plt 473.. Ok for treatment.  .-tumor marker primo to decrease from 61.4 to 44.8    2. Psych: on olanzapine    3. HTN: on metoprolol    4. Gerd: on ranitidine  5. Dyspnea: get a cxr to evaluate  6. Anemia: will check iron profile, ferritin. Her creatinine is normal at 0.94.   7. DVT on xarelto indefinite.  --Doppler left LE  1/30/19 There is evidence of deep venous thrombosis of the left lower extremity. The clot burden is improved from last study. There is also evidence of superficial clot in the greater saphenous vein. There is a Baker's cyst in the popliteal fossa.  8. CHF: LVEF 65% with diastolic dysfunction. referral to cardiology to maximized her heart function.  9. Thrombocytosis: suspect reactive from anemia. Will check iron profile.  10.left lung pneumonia: resolved  --cxr showed pneumonia. Pt already on levofloxacin. Will complete 14 days.   11: LE edema: improved on lasix.  12. FEN:  Low potassium on  kdur. Also will check magnesium. Advised 20meq kdur bid for 2 days then 20meq once a day thereafter.  13. otits media of the right ear with effusion. This happens off and on for her. She usually gets some medication from pcp. Given her multiple allergies will have her f/u with pcp with what has worked for her in the past.    Moise Sotelo MD    5/23/2019    11:52 AM   Other

## 2025-03-14 NOTE — BH CONSULTATION LIAISON PROGRESS NOTE - CURRENT MEDICATION
MEDICATIONS  (STANDING):  enoxaparin Injectable 40 milliGRAM(s) SubCutaneous every 24 hours  risperiDONE   Tablet 0.25 milliGRAM(s) Oral two times a day    MEDICATIONS  (PRN):  acetaminophen     Tablet .. 650 milliGRAM(s) Oral every 6 hours PRN Temp greater or equal to 38C (100.4F), Mild Pain (1 - 3)  aluminum hydroxide/magnesium hydroxide/simethicone Suspension 30 milliLiter(s) Oral every 4 hours PRN Dyspepsia  melatonin 3 milliGRAM(s) Oral at bedtime PRN Insomnia  ondansetron Injectable 4 milliGRAM(s) IV Push every 8 hours PRN Nausea and/or Vomiting  
MEDICATIONS  (STANDING):  cefTRIAXone   IVPB      cefTRIAXone   IVPB 1000 milliGRAM(s) IV Intermittent once  enoxaparin Injectable 40 milliGRAM(s) SubCutaneous every 24 hours  lactated ringers. 1000 milliLiter(s) (75 mL/Hr) IV Continuous <Continuous>  potassium chloride   Powder 40 milliEquivalent(s) Oral every 4 hours  potassium chloride  10 mEq/100 mL IVPB 10 milliEquivalent(s) IV Intermittent every 1 hour  risperiDONE   Tablet 0.25 milliGRAM(s) Oral two times a day    MEDICATIONS  (PRN):  acetaminophen     Tablet .. 650 milliGRAM(s) Oral every 6 hours PRN Temp greater or equal to 38C (100.4F), Mild Pain (1 - 3)  aluminum hydroxide/magnesium hydroxide/simethicone Suspension 30 milliLiter(s) Oral every 4 hours PRN Dyspepsia  melatonin 3 milliGRAM(s) Oral at bedtime PRN Insomnia  ondansetron Injectable 4 milliGRAM(s) IV Push every 8 hours PRN Nausea and/or Vomiting

## 2025-03-14 NOTE — BH CONSULTATION LIAISON PROGRESS NOTE - NSBHFUPINTERVALHXFT_PSY_A_CORE
RRT for hypotension earlier, likely secondary to poor PO fluid intake as per staff. Did not require STAT IM medications for agitation since last seen. Started on low dose risperdal - took 2 doses and manifesting clinical efficacy as Patient is more calm, cooperative, engages more appropriately. No evidence of adverse medication side effects. Seen outside room, sitting calmly chair looking at her phone. Patient said she is trying to call a taxic company for her sister; remains baseline pleasantly confused, disoriented. Looks comfortable, no abnormal movements noted. 
Patient required STAT IM medications for agitation  treatment at 10pm and at midnight this past night. Started on low dose risperdal - only took 1 dose thus far so too early in treatment to ascertain efficacy. Seen at bedside - doszing off on her bed, sitting on it and laying on her side. Looks comfortable, no abnormal movements noted.

## 2025-03-14 NOTE — BH CONSULTATION LIAISON PROGRESS NOTE - NSBHATTESTBILLING_PSY_A_CORE
[id number] OBS or IP - low complexity OR 25-34 mins
00017-[id number] OBS or IP - low complexity OR 25-34 mins

## 2025-03-15 LAB
GLUCOSE BLDC GLUCOMTR-MCNC: 88 MG/DL — SIGNIFICANT CHANGE UP (ref 70–99)
HCT VFR BLD CALC: 32.5 % — LOW (ref 34.5–45)
HGB BLD-MCNC: 10.8 G/DL — LOW (ref 11.5–15.5)
MCHC RBC-ENTMCNC: 30.9 PG — SIGNIFICANT CHANGE UP (ref 27–34)
MCHC RBC-ENTMCNC: 33.2 G/DL — SIGNIFICANT CHANGE UP (ref 32–36)
MCV RBC AUTO: 93.1 FL — SIGNIFICANT CHANGE UP (ref 80–100)
NRBC BLD AUTO-RTO: 0 /100 WBCS — SIGNIFICANT CHANGE UP (ref 0–0)
PLATELET # BLD AUTO: 124 K/UL — LOW (ref 150–400)
RBC # BLD: 3.49 M/UL — LOW (ref 3.8–5.2)
RBC # FLD: 14.6 % — HIGH (ref 10.3–14.5)
WBC # BLD: 2.34 K/UL — LOW (ref 3.8–10.5)
WBC # FLD AUTO: 2.34 K/UL — LOW (ref 3.8–10.5)

## 2025-03-15 PROCEDURE — 99232 SBSQ HOSP IP/OBS MODERATE 35: CPT

## 2025-03-15 RX ORDER — OLANZAPINE 10 MG/1
5 TABLET ORAL ONCE
Refills: 0 | Status: COMPLETED | OUTPATIENT
Start: 2025-03-15 | End: 2025-03-15

## 2025-03-15 RX ORDER — HALOPERIDOL 10 MG/1
2 TABLET ORAL ONCE
Refills: 0 | Status: DISCONTINUED | OUTPATIENT
Start: 2025-03-15 | End: 2025-03-15

## 2025-03-15 RX ORDER — COLLAGENASE CLOSTRIDIUM HIST. 250 UNIT/G
1 OINTMENT (GRAM) TOPICAL DAILY
Refills: 0 | Status: DISCONTINUED | OUTPATIENT
Start: 2025-03-15 | End: 2025-03-25

## 2025-03-15 RX ADMIN — Medication 0.25 MILLIGRAM(S): at 05:58

## 2025-03-15 RX ADMIN — CEFTRIAXONE 100 MILLIGRAM(S): 500 INJECTION, POWDER, FOR SOLUTION INTRAMUSCULAR; INTRAVENOUS at 13:56

## 2025-03-15 RX ADMIN — OLANZAPINE 5 MILLIGRAM(S): 10 TABLET ORAL at 15:24

## 2025-03-15 RX ADMIN — OLANZAPINE 5 MILLIGRAM(S): 10 TABLET ORAL at 23:46

## 2025-03-15 RX ADMIN — ENOXAPARIN SODIUM 40 MILLIGRAM(S): 100 INJECTION SUBCUTANEOUS at 21:05

## 2025-03-15 RX ADMIN — Medication 3 MILLIGRAM(S): at 22:50

## 2025-03-15 RX ADMIN — Medication 0.25 MILLIGRAM(S): at 17:36

## 2025-03-15 RX ADMIN — SODIUM CHLORIDE 75 MILLILITER(S): 9 INJECTION, SOLUTION INTRAVENOUS at 03:28

## 2025-03-15 NOTE — PROGRESS NOTE ADULT - ASSESSMENT
Chloe Blackmon is an 84 year old female with PMHx of HTN, paranoid schizophrenia, and dementia who presented to the ED on 3/8/25 for complaints of inability to care for herself and admitted for acute metabolic encephalopathy secondary to UTI.    # Hypotension / ? UTI- UA on admission noted elevated.  IVF.  BP normalized.  Pt asymptomatic.  Short course of Abx.    # L Breast Mass - High suspicion for malignancy.  Wound care with Xeroform.  Options including biopsy with further possible treatment including ? chemo / RT / surgery d/w son.  Given pt's periodic agitation and known h/o paranoia, pt would be a poor candidate to tolerate further evaluation and treatment.  Son in agreement, wishes to defer any further workup and treatment.  States he has discussed extensively with family.  # Dementia with Paranoia, ? Paranoid Schizophrenia - per psychiatry, does not have schizophrenia.  Risperdal prn.  Pt's son reports pt with 2 psychiatric hospitalizations over past 20y for "some type of paranoia".    # PATRICIO - Cr 1.3 on adission, normalized.  D/c IVF  #Hiatal hernia, incidental finding- Will need outpatient f/u, family aware   # HTN - BP controlled.  Continue to monitor  # Inpatient DVT Prophylaxis - Lovenox subcut    Plan of care discussed with pt's son Carlos 3/14.  d/w daughter in law Sandra .  Now aware of likely malignancy and possible deterioration / decline / death.  Given pt's history of paranoia, agrees that workup / treatment would be debilitating for her.  Expressed understanding of the risks, benefits, alternatives.    In agreement with [facility] / SNF placement with possible progression

## 2025-03-16 LAB
ANION GAP SERPL CALC-SCNC: 5 MMOL/L — SIGNIFICANT CHANGE UP (ref 5–17)
BUN SERPL-MCNC: 6 MG/DL — LOW (ref 7–23)
CALCIUM SERPL-MCNC: 10.1 MG/DL — SIGNIFICANT CHANGE UP (ref 8.5–10.1)
CHLORIDE SERPL-SCNC: 102 MMOL/L — SIGNIFICANT CHANGE UP (ref 96–108)
CO2 SERPL-SCNC: 30 MMOL/L — SIGNIFICANT CHANGE UP (ref 22–31)
CREAT SERPL-MCNC: 0.88 MG/DL — SIGNIFICANT CHANGE UP (ref 0.5–1.3)
EGFR: 64 ML/MIN/1.73M2 — SIGNIFICANT CHANGE UP
EGFR: 64 ML/MIN/1.73M2 — SIGNIFICANT CHANGE UP
GLUCOSE SERPL-MCNC: 107 MG/DL — HIGH (ref 70–99)
POTASSIUM SERPL-MCNC: 3.5 MMOL/L — SIGNIFICANT CHANGE UP (ref 3.5–5.3)
POTASSIUM SERPL-SCNC: 3.5 MMOL/L — SIGNIFICANT CHANGE UP (ref 3.5–5.3)
SODIUM SERPL-SCNC: 137 MMOL/L — SIGNIFICANT CHANGE UP (ref 135–145)

## 2025-03-16 PROCEDURE — 99232 SBSQ HOSP IP/OBS MODERATE 35: CPT

## 2025-03-16 RX ORDER — AMLODIPINE BESYLATE 10 MG/1
5 TABLET ORAL DAILY
Refills: 0 | Status: DISCONTINUED | OUTPATIENT
Start: 2025-03-16 | End: 2025-03-25

## 2025-03-16 RX ADMIN — AMLODIPINE BESYLATE 5 MILLIGRAM(S): 10 TABLET ORAL at 17:56

## 2025-03-16 RX ADMIN — Medication 0.25 MILLIGRAM(S): at 05:58

## 2025-03-16 RX ADMIN — ENOXAPARIN SODIUM 40 MILLIGRAM(S): 100 INJECTION SUBCUTANEOUS at 22:32

## 2025-03-16 RX ADMIN — Medication 0.25 MILLIGRAM(S): at 17:57

## 2025-03-16 RX ADMIN — CEFTRIAXONE 100 MILLIGRAM(S): 500 INJECTION, POWDER, FOR SOLUTION INTRAMUSCULAR; INTRAVENOUS at 14:27

## 2025-03-16 RX ADMIN — Medication 1 APPLICATION(S): at 11:38

## 2025-03-16 NOTE — PROGRESS NOTE ADULT - ASSESSMENT
Chloe Blackmon is an 84 year old female with PMHx of HTN, paranoid schizophrenia, and dementia who presented to the ED on 3/8/25 for complaints of inability to care for herself and admitted for acute metabolic encephalopathy secondary to UTI.    # Hypotension / ? UTI- UA on admission noted elevated.  IVF.  BP normalized.  Pt asymptomatic.  Short course of Abx.    # L Breast Mass - High suspicion for malignancy.  Wound care with Xeroform.  Options including biopsy with further possible treatment including ? chemo / RT / surgery d/w son.  Given pt's periodic agitation and known h/o paranoia, pt would be a poor candidate to tolerate further evaluation and treatment.  Son in agreement, wishes to defer any further workup and treatment.  States he has discussed extensively with family.  # Dementia with Paranoia, ? Paranoid Schizophrenia - per psychiatry, does not have schizophrenia.  Risperdal prn.  Pt's son reports pt with 2 psychiatric hospitalizations over past 20y for "some type of paranoia".    # PATRICIO - Cr 1.3 on adission, normalized.  D/c IVF  #Hiatal hernia, incidental finding- Will need outpatient f/u, family aware   # HTN - BP elevated.  Add norvasc.   # Inpatient DVT Prophylaxis - Lovenox subcut    Plan of care discussed with pt's son Carlos 3/14.  d/w daughter in law Sandra .  Now aware of likely malignancy and possible deterioration / decline / death.  Given pt's history of paranoia, agrees that workup / treatment would be debilitating for her.  Expressed understanding of the risks, benefits, alternatives.    In agreement with [facility] / SNF placement with possible progression

## 2025-03-16 NOTE — DIETITIAN INITIAL EVALUATION ADULT - REASON
Pt eating breakfast at time of visit; observed pt with mild temporal wasting and mild orbital depletion

## 2025-03-16 NOTE — DIETITIAN INITIAL EVALUATION ADULT - OTHER INFO
Pt eating breakfast during visit; unable to interview pt due to cognitive limitations and very [place]. Per medical record, pt with PMH of HTN, paranoid schizophrenia, and dementia; presented for complaints of inability to care for herself and admitted for acute metabolic encephalopathy secondary to UTI. Per psych, pt with senile dementia with paranoia. Per Hospitalist note, pt also with L breast mass, likely with malignancy and possible deterioration/decline/death; son aware, and given pt's hx of paranoia, agrees workup/tx would be debilitating for her and inappropriate. Pt lived alone PTA, however now for [facility]/SNF placement. Per flow sheets, pt with mostly poor to fair PO intake; 0-75% for documented meals. Will provide nutrition supplement for additional nutrition. Pt would likely benefit from easy to chew consistency.

## 2025-03-16 NOTE — DIETITIAN INITIAL EVALUATION ADULT - PERTINENT MEDS FT
MEDICATIONS  (STANDING):  cefTRIAXone   IVPB      cefTRIAXone   IVPB 1000 milliGRAM(s) IV Intermittent every 24 hours  collagenase Ointment 1 Application(s) Topical daily  enoxaparin Injectable 40 milliGRAM(s) SubCutaneous every 24 hours  lactated ringers. 1000 milliLiter(s) (75 mL/Hr) IV Continuous <Continuous>  risperiDONE   Tablet 0.25 milliGRAM(s) Oral two times a day    MEDICATIONS  (PRN):  acetaminophen     Tablet .. 650 milliGRAM(s) Oral every 6 hours PRN Temp greater or equal to 38C (100.4F), Mild Pain (1 - 3)  aluminum hydroxide/magnesium hydroxide/simethicone Suspension 30 milliLiter(s) Oral every 4 hours PRN Dyspepsia  melatonin 3 milliGRAM(s) Oral at bedtime PRN Insomnia  ondansetron Injectable 4 milliGRAM(s) IV Push every 8 hours PRN Nausea and/or Vomiting

## 2025-03-16 NOTE — DIETITIAN INITIAL EVALUATION ADULT - PERTINENT LABORATORY DATA
03-16    137  |  102  |  6[L]  ----------------------------<  107[H]  3.5   |  30  |  0.88    Ca    10.1      16 Mar 2025 07:20  Phos  3.3     03-14  Mg     1.9     03-14

## 2025-03-16 NOTE — DIETITIAN INITIAL EVALUATION ADULT - NSFNSPHYEXAMSKINFT_GEN_A_CORE
No pressure ulcers noted per patient profile, however pressure ulcer-R posterior shoulder noted in flow sheets, however stage unspecified

## 2025-03-16 NOTE — DIETITIAN INITIAL EVALUATION ADULT - ADD RECOMMEND
1. monitor and replace electrolytes as needed  2. continue to provide encouragement and assistance with PO intake  3. palliative consult

## 2025-03-16 NOTE — DIETITIAN INITIAL EVALUATION ADULT - ENTER TO (ML/KG)
"[facility] GASTROENTEROLOGY - OFFICE NOTE    11/4/2019    Allison Wilkins   1975    Primary Physician: Rodney George MD    Chief Complaint   Patient presents with   • Abdominal Pain         HISTORY OF PRESENT ILLNESS:     Allison Wilkins is a 44 y.o. female presents  with yulia pain started 1 mo ago. This was intermittent. Described as a \" hurt.and bloating\".  Was on omeprazole daily for years.  Switched to pantoprazole and better. Did note some pain after drinking some etoh one night. Has had nausea. Has been taking ibuprofen 2-3 days per week for headache.  Has been under some stress and does have history of irritable bowel syndrome.  She takes Bentyl as needed.  No history pud.  No vomiting. No black stool. No fever. No heartburn. No dysphagia.         ==========================================================================  Ov  4-19-19  Allison Wilkins is a 43 y.o. female presents  with IBS. Takes bentyl more frequently recently. Bowel habits alternate. Typically has bm every day to every other day. Does strain a lot with bm. Has had lower abdominal cramping recently after eating that resolved after bm. Will have nausea with these episodes as well.  She uses Bentyl as needed and does help.  No rectal bleeding. No fever. No weight loss.      Does not take a fiber supplement.      Started taking  zanaflex for 6 months.         Last egd 12/2016 path negative for beth's.   Last colonoscopy 12/2013 recommend recall age 49.          Ov  12/2016  Allison Wilkins is a 41 y.o. female irritable bowel syndrome.  She has had symptoms for several years.  She can alternate from constipation to diarrhea.  She does have episodes of postprandial abdominal cramping followed by loose stools.  She does take Bentyl which does seem to help.  Her symptoms are \"unpredictable\".  No significant rectal bleeding currently.  She has lost some weight but has been mostly intentional.  She does need a refill of Bentyl.  No vomiting.  No fevers. "  She is up-to-date on colonoscopy.     Stewart's esophagus    She has history of Stewart's esophagus.  Was diagnosed around 4 years ago.  She is taking over-the-counter omeprazole on a daily basis which seems to control her acid reflux symptoms she denies dysphagia or odynophagia.  Denies hematemesis.  His lost some weight but has been intentional.  Has occasional nausea but no vomiting.     Past Medical History:   Diagnosis Date   • Stewart syndrome    • Disease of thyroid gland    • Dysphagia    • Eosinophilic esophagitis    • GERD (gastroesophageal reflux disease)    • Heavy periods     with pelvic pain   • Hemorrhoids    • Hypertension    • IBS (irritable bowel syndrome)    • PONV (postoperative nausea and vomiting)    • Rectal bleeding        Past Surgical History:   Procedure Laterality Date   • CHOLECYSTECTOMY     • DILATION AND CURETTAGE, DIAGNOSTIC / THERAPEUTIC     • ENDOSCOPY N/A 12/29/2016    Procedure: ESOPHAGOGASTRODUODENOSCOPY WITH ANESTHESIA;  Surgeon: Theodore Traylor MD;  Location: [facility] ENDOSCOPY;  Service:    • ENDOSCOPY AND COLONOSCOPY  12/04/2013   • TOTAL LAPAROSCOPIC HYSTERECTOMY N/A 12/21/2017    Procedure: TOTAL LAPAROSCOPIC HYSTERECTOMY WITH DAVINCI SI ROBOT;  Surgeon: Joyce Mcfadden MD;  Location: [facility] OR;  Service:    • TUBAL ABDOMINAL LIGATION         Outpatient Medications Marked as Taking for the 11/4/19 encounter (Office Visit) with Tashia Bahena APRN   Medication Sig Dispense Refill   • cetirizine (zyrTEC) 10 MG tablet Take 10 mg by mouth daily.     • dicyclomine (BENTYL) 10 MG capsule Take 1 capsule by mouth Every 6 (Six) Hours As Needed (post prandrial abd cramping,  with diarrhea). 60 capsule 11   • levothyroxine (SYNTHROID, LEVOTHROID) 175 MCG tablet Take 175 mcg by mouth daily.     • omeprazole (priLOSEC) 20 MG capsule prn     • ondansetron (ZOFRAN) 8 MG tablet Take 4 mg by mouth Every 8 (Eight) Hours As Needed for Nausea.     • pantoprazole (PROTONIX) 40 MG EC  "tablet Take 40 mg by mouth Daily.     • promethazine (PHENERGAN) 25 MG tablet 25 mg Every 6 (Six) Hours As Needed for Nausea or Vomiting.     • sucralfate (CARAFATE) 1 g tablet Take 1 g by mouth Daily As Needed.     • SUMAtriptan (IMITREX) 50 MG tablet Take 50 mg by mouth As Needed for Migraine. Take one tablet at onset of headache. May repeat dose one time in 2 hours if headache not relieved.     • tiZANidine (ZANAFLEX) 4 MG tablet 4 mg Daily.         No Known Allergies    Social History     Socioeconomic History   • Marital status:      Spouse name: Not on file   • Number of children: Not on file   • Years of education: Not on file   • Highest education level: Not on file   Tobacco Use   • Smoking status: Never Smoker   • Smokeless tobacco: Never Used   Substance and Sexual Activity   • Alcohol use: Yes     Comment: just occassional every 3-4 months   • Drug use: No   • Sexual activity: Defer       Family History   Problem Relation Age of Onset   • Diverticulitis Mother    • Colon polyps Maternal Grandmother    • Colon cancer Neg Hx        Review of Systems   Constitutional: Negative for chills, fever and unexpected weight change.   Respiratory: Negative for cough, shortness of breath and wheezing.    Cardiovascular: Negative for chest pain and palpitations.   Gastrointestinal: Positive for abdominal pain and nausea. Negative for abdominal distention, anal bleeding, blood in stool, constipation, diarrhea and vomiting.        Vitals:    11/04/19 0817   BP: 138/82   Pulse: 55   Temp: 97.8 °F (36.6 °C)   SpO2: 98%   Weight: 81.6 kg (180 lb)   Height: 162.6 cm (64\")      Body mass index is 30.9 kg/m².    Physical Exam   Constitutional: No distress.   Cardiovascular: Normal rate, regular rhythm and normal heart sounds.   Pulmonary/Chest: Effort normal and breath sounds normal.   Abdominal: Soft. Bowel sounds are normal. She exhibits no distension. There is tenderness (mild tenderness yulia ).   Musculoskeletal: " She exhibits no edema.   Neurological: She is alert.   Skin: Skin is warm and dry.   Vitals reviewed.      Results for orders placed or performed during the hospital encounter of 09/06/19   POC Creatinine   Result Value Ref Range    Creatinine 0.50 (L) 0.60 - 1.30 mg/dL           ASSESSMENT AND PLAN    Assessment/Plan     Allison was seen today for abdominal pain.    Diagnoses and all orders for this visit:    Epigastric pain  -     Cancel: Case Request; Standing  -     Cancel: Case Request  -     Case Request; Standing  -     Case Request    Other orders  -     Cancel: Follow Anesthesia Guidelines / Standing Orders; Future  -     Cancel: Implement Anesthesia Orders Day of Procedure; Standing  -     Cancel: Obtain Informed Consent; Standing  -     Cancel: Pacemaker Interrogation; Future  -     Cancel: Obtain Informed Consent; Future  -     Follow Anesthesia Guidelines / Standing Orders; Future  -     Implement Anesthesia Orders Day of Procedure; Standing  -     Obtain Informed Consent; Standing  -     Obtain Informed Consent; Future      In regards to yulia pain, differential diagnosis discussed.  She has been taking ibuprofen for headaches.  I recommend that she stop taking NSAIDs and not to take any aspirin for now.  She may use Tylenol for headaches.  I recommend that she start taking pantoprazole on a daily basis.  May use omeprazole in the evening if needed for breakthrough reflux.  Discussed that she might want to try Bentyl as well for bloating.  I do recommend upper endoscopy for further evaluation and she is agreeable.    ESOPHAGOGASTRODUODENOSCOPY WITH ANESTHESIA (N/A)   Risk, benefits, and alternatives of endoscopy were explained in full.  They understand that there is a risk of bleeding, perforation, and infection.  The risk of perforation goes up with esophageal dilation.  Other options to evaluate UGI complaints could involve barium swallow or UGI series, but these would be diagnostic tests only.  Patient  was given time to ask questions.  I answered them to their satisfaction and they are agreeable to proceeding         Body mass index is 30.9 kg/m².    Patient's Body mass index is 30.9 kg/m². BMI is above normal parameters. Recommendations include: no follow up , recommend weight loss .       DEEPTI Pacheco    EMR Dragon/transcription disclaimer:  Much of this encounter note is electronic transcription/translation of spoken language to printed text.  The electronic translation of spoken language may be erroneous, or at times, nonsensical words or phrases may be inadvertently transcribed.  Although I have reviewed the note for such errors, some may still exist.       30

## 2025-03-17 LAB
HCT VFR BLD CALC: 40.3 % — SIGNIFICANT CHANGE UP (ref 34.5–45)
HGB BLD-MCNC: 13.5 G/DL — SIGNIFICANT CHANGE UP (ref 11.5–15.5)
MCHC RBC-ENTMCNC: 31 PG — SIGNIFICANT CHANGE UP (ref 27–34)
MCHC RBC-ENTMCNC: 33.5 G/DL — SIGNIFICANT CHANGE UP (ref 32–36)
MCV RBC AUTO: 92.6 FL — SIGNIFICANT CHANGE UP (ref 80–100)
NRBC BLD AUTO-RTO: 0 /100 WBCS — SIGNIFICANT CHANGE UP (ref 0–0)
PLATELET # BLD AUTO: 154 K/UL — SIGNIFICANT CHANGE UP (ref 150–400)
RBC # BLD: 4.35 M/UL — SIGNIFICANT CHANGE UP (ref 3.8–5.2)
RBC # FLD: 14.9 % — HIGH (ref 10.3–14.5)
WBC # BLD: 2.82 K/UL — LOW (ref 3.8–10.5)
WBC # FLD AUTO: 2.82 K/UL — LOW (ref 3.8–10.5)

## 2025-03-17 PROCEDURE — 99232 SBSQ HOSP IP/OBS MODERATE 35: CPT

## 2025-03-17 RX ADMIN — SODIUM CHLORIDE 75 MILLILITER(S): 9 INJECTION, SOLUTION INTRAVENOUS at 06:50

## 2025-03-17 RX ADMIN — Medication 3 MILLIGRAM(S): at 21:58

## 2025-03-17 RX ADMIN — CEFTRIAXONE 100 MILLIGRAM(S): 500 INJECTION, POWDER, FOR SOLUTION INTRAMUSCULAR; INTRAVENOUS at 14:03

## 2025-03-17 RX ADMIN — ENOXAPARIN SODIUM 40 MILLIGRAM(S): 100 INJECTION SUBCUTANEOUS at 21:58

## 2025-03-17 RX ADMIN — Medication 0.25 MILLIGRAM(S): at 17:04

## 2025-03-17 RX ADMIN — SODIUM CHLORIDE 75 MILLILITER(S): 9 INJECTION, SOLUTION INTRAVENOUS at 21:58

## 2025-03-17 RX ADMIN — Medication 1 APPLICATION(S): at 11:03

## 2025-03-17 RX ADMIN — AMLODIPINE BESYLATE 5 MILLIGRAM(S): 10 TABLET ORAL at 06:41

## 2025-03-17 RX ADMIN — Medication 0.25 MILLIGRAM(S): at 06:41

## 2025-03-17 NOTE — PROGRESS NOTE ADULT - ASSESSMENT
Chloe Blackmon is an 84 year old female with PMHx of HTN, paranoid schizophrenia, and dementia who presented to the ED on 3/8/25 for complaints of inability to care for herself and admitted for acute metabolic encephalopathy secondary to UTI.    # Hypotension / ? UTI- UA on admission noted elevated.  IVF.  BP normalized.  Pt asymptomatic.  Short course of Abx.    # L Breast Mass - High suspicion for malignancy.  Wound care with Xeroform.  Options including biopsy with further possible treatment including ? chemo / RT / surgery d/w son.  Given pt's periodic agitation and known h/o paranoia, pt would be a poor candidate to tolerate further evaluation and treatment.  Son in agreement, wishes to defer any further workup and treatment.  States he has discussed extensively with family.  # Dementia with Paranoia, ? Paranoid Schizophrenia - per psychiatry, does not have schizophrenia.  Risperdal prn.  Pt's son reports pt with 2 psychiatric hospitalizations over past 20y for "some type of paranoia".    # PATRICIO - Cr 1.3 on adission, normalized.  D/c IVF  #Hiatal hernia, incidental finding- Will need outpatient f/u, family aware   # HTN - BP elevated.  Add norvasc.   # Inpatient DVT Prophylaxis - Lovenox subcut    Plan of care discussed with pt's son Carlos 3/14.  d/w daughter in law Sandra  3/15.  Now aware of likely malignancy and possible deterioration / decline / death.  Given pt's history of paranoia, agrees that workup / treatment would be debilitating for her.  Expressed understanding of the risks, benefits, alternatives.    In agreement with [facility] / SNF placement with possible progression

## 2025-03-18 LAB
ANION GAP SERPL CALC-SCNC: 4 MMOL/L — LOW (ref 5–17)
BUN SERPL-MCNC: 14 MG/DL — SIGNIFICANT CHANGE UP (ref 7–23)
CALCIUM SERPL-MCNC: 9.3 MG/DL — SIGNIFICANT CHANGE UP (ref 8.5–10.1)
CHLORIDE SERPL-SCNC: 106 MMOL/L — SIGNIFICANT CHANGE UP (ref 96–108)
CO2 SERPL-SCNC: 30 MMOL/L — SIGNIFICANT CHANGE UP (ref 22–31)
CREAT SERPL-MCNC: 0.86 MG/DL — SIGNIFICANT CHANGE UP (ref 0.5–1.3)
EGFR: 66 ML/MIN/1.73M2 — SIGNIFICANT CHANGE UP
EGFR: 66 ML/MIN/1.73M2 — SIGNIFICANT CHANGE UP
GLUCOSE SERPL-MCNC: 118 MG/DL — HIGH (ref 70–99)
POTASSIUM SERPL-MCNC: 3.8 MMOL/L — SIGNIFICANT CHANGE UP (ref 3.5–5.3)
POTASSIUM SERPL-SCNC: 3.8 MMOL/L — SIGNIFICANT CHANGE UP (ref 3.5–5.3)
SODIUM SERPL-SCNC: 140 MMOL/L — SIGNIFICANT CHANGE UP (ref 135–145)

## 2025-03-18 PROCEDURE — 99232 SBSQ HOSP IP/OBS MODERATE 35: CPT

## 2025-03-18 RX ADMIN — Medication 0.25 MILLIGRAM(S): at 17:45

## 2025-03-18 RX ADMIN — SODIUM CHLORIDE 75 MILLILITER(S): 9 INJECTION, SOLUTION INTRAVENOUS at 17:46

## 2025-03-18 RX ADMIN — Medication 0.25 MILLIGRAM(S): at 05:46

## 2025-03-18 RX ADMIN — Medication 3 MILLIGRAM(S): at 22:23

## 2025-03-18 RX ADMIN — Medication 1 APPLICATION(S): at 12:08

## 2025-03-18 RX ADMIN — AMLODIPINE BESYLATE 5 MILLIGRAM(S): 10 TABLET ORAL at 05:47

## 2025-03-18 RX ADMIN — ENOXAPARIN SODIUM 40 MILLIGRAM(S): 100 INJECTION SUBCUTANEOUS at 22:23

## 2025-03-19 LAB
HCT VFR BLD CALC: 32.2 % — LOW (ref 34.5–45)
HGB BLD-MCNC: 10.6 G/DL — LOW (ref 11.5–15.5)
MCHC RBC-ENTMCNC: 31 PG — SIGNIFICANT CHANGE UP (ref 27–34)
MCHC RBC-ENTMCNC: 32.9 G/DL — SIGNIFICANT CHANGE UP (ref 32–36)
MCV RBC AUTO: 94.2 FL — SIGNIFICANT CHANGE UP (ref 80–100)
NRBC BLD AUTO-RTO: 0 /100 WBCS — SIGNIFICANT CHANGE UP (ref 0–0)
PLATELET # BLD AUTO: 155 K/UL — SIGNIFICANT CHANGE UP (ref 150–400)
RBC # BLD: 3.42 M/UL — LOW (ref 3.8–5.2)
RBC # FLD: 15.1 % — HIGH (ref 10.3–14.5)
WBC # BLD: 2.7 K/UL — LOW (ref 3.8–10.5)
WBC # FLD AUTO: 2.7 K/UL — LOW (ref 3.8–10.5)

## 2025-03-19 PROCEDURE — 99232 SBSQ HOSP IP/OBS MODERATE 35: CPT

## 2025-03-19 RX ADMIN — Medication 1 APPLICATION(S): at 12:25

## 2025-03-19 RX ADMIN — ENOXAPARIN SODIUM 40 MILLIGRAM(S): 100 INJECTION SUBCUTANEOUS at 22:17

## 2025-03-19 RX ADMIN — Medication 0.25 MILLIGRAM(S): at 05:53

## 2025-03-19 RX ADMIN — AMLODIPINE BESYLATE 5 MILLIGRAM(S): 10 TABLET ORAL at 05:53

## 2025-03-19 RX ADMIN — Medication 0.25 MILLIGRAM(S): at 17:22

## 2025-03-19 RX ADMIN — SODIUM CHLORIDE 75 MILLILITER(S): 9 INJECTION, SOLUTION INTRAVENOUS at 05:52

## 2025-03-19 NOTE — PROGRESS NOTE ADULT - ASSESSMENT
Chloe Blackmon is an 84 year old female with PMHx of HTN, paranoid schizophrenia, and dementia who presented to the ED on 3/8/25 for complaints of inability to care for herself and admitted for acute metabolic encephalopathy secondary to UTI.    ##Acute metabolic encephalopathy secondary to UTI  Son reports patient with declining mentation since he last saw her two weeks ago  Baseline mentation is AAO x 2 to person and place, now only AAO x 1 to person only  U/A with proteinuria, ketonuria, large bilirubin, small leuks, positive nitrites, WBC 10, RBC 30, moderate bacteria, squamous epithelial cells present, glucosuria, COVID/influenza/RSV negative  CT head without acute intracranial findings, CXR without infectious etiology but with moderate-sized hiatal hernia. S/p ceftriaxone 1 g IV in the ED. B12 elevated. UCx normal filippo. TSH WNL RPR negative. S/p Abx course. MR Brain WNL. Per psychiatry, does not have schizophrenia.   - Risperdal PRN  - Reorient PRN, avoid sedating meds    #PATRICIO  BUN 25 and Cr 1.32 -> 1.11.   S/p  cc bolus in the ED  Avoid nephrotoxins, renally dose meds  Encourage PO hydration  Monitor renal function    #Social admit  Son reports patient with bouts of paranoid schizophrenia where she will chain up her apartment door so no one can come in. Son did not notice movement on cameras in patient's apartment for a week, used  to cut chains upon arrival to her apartment. Found in bed soiled in urine and feces and apartment is unkempt  Previously declining coming to live with son and daughter-in-law in [place]. Son requesting placement in facility for safety as patient is unable to care for herself. Case management and  consulted for assistance in disposition    #Hiatal hernia, incidental finding  - Will need outpatient f/u, family aware     #HTN, uncontrolled secondary to medication noncompliance  BP as elevated as 198/83, will hold off on initiation of antihypertensives for now given patient refusal to take PO meds  - C/w amlodipine 5/d    #Dementia: not on any PTA meds, turn and reposition q2    Diet: ETC  DVT prophylaxis: lovenox    Dispo: pending placement   Code Status: FC     I have spent a total of 42 minutes to prepare to see the patient, obtaining and reviewing history, physical examination, explaining the diagnosis, prognosis and treatment plan with the patient/family/caregiver. I also have spent the time ordering studies and testing, interpreting results, medicine reconciliation, IDRs, subspecialty consultation and documentation as above.         Chloe Blackmon is an 84 year old female with PMHx of HTN, paranoid schizophrenia, and dementia who presented to the ED on 3/8/25 for complaints of inability to care for herself and admitted for acute metabolic encephalopathy secondary to UTI.    ##Acute metabolic encephalopathy secondary to UTI  Son reports patient with declining mentation since he last saw her two weeks ago  Baseline mentation is AAO x 2 to person and place, now only AAO x 1 to person only  U/A with proteinuria, ketonuria, large bilirubin, small leuks, positive nitrites, WBC 10, RBC 30, moderate bacteria, squamous epithelial cells present, glucosuria, COVID/influenza/RSV negative  CT head without acute intracranial findings, CXR without infectious etiology but with moderate-sized hiatal hernia. S/p ceftriaxone 1 g IV in the ED. B12 elevated. UCx normal filippo. TSH WNL RPR negative. S/p Abx course. MR Brain WNL. Per psychiatry, does not have schizophrenia.   - Risperdal PRN  - Reorient PRN, avoid sedating meds    # L Breast Mass  High suspicion for malignancy.  Wound care with Xeroform.  Options including biopsy with further possible treatment including ? chemo / RT / surgery d/w son.  Given pt's periodic agitation and known h/o paranoia, pt would be a poor candidate to tolerate further evaluation and treatment.  Previous hospitalist discussed options with Son who is in agreement, wishes to defer any further workup and treatment.  States he has discussed extensively with family. I personally discussed with patient who confirmed deferring workup for now.     #PATRICIO  BUN 25 and Cr 1.32 -> 1.11.   S/p  cc bolus in the ED  Avoid nephrotoxins, renally dose meds  Encourage PO hydration  Monitor renal function    #Social admit  Son reports patient with bouts of paranoid schizophrenia where she will chain up her apartment door so no one can come in. Son did not notice movement on cameras in patient's apartment for a week, used  to cut chains upon arrival to her apartment. Found in bed soiled in urine and feces and apartment is unkempt  Previously declining coming to live with son and daughter-in-law in [place]. Son requesting placement in facility for safety as patient is unable to care for herself. Case management and  consulted for assistance in disposition    #Hiatal hernia, incidental finding  - Will need outpatient f/u, family aware     #HTN, uncontrolled secondary to medication noncompliance  BP as elevated as 198/83, will hold off on initiation of antihypertensives for now given patient refusal to take PO meds  - C/w amlodipine 5/d    #Dementia: not on any PTA meds, turn and reposition q2    Diet: ETC  DVT prophylaxis: lovenox    Dispo: pending placement   Code Status: FC     Discussed care with Son on 03/19    I have spent a total of 42 minutes to prepare to see the patient, obtaining and reviewing history, physical examination, explaining the diagnosis, prognosis and treatment plan with the patient/family/caregiver. I also have spent the time ordering studies and testing, interpreting results, medicine reconciliation, IDRs, subspecialty consultation and documentation as above.

## 2025-03-20 DIAGNOSIS — R53.1 WEAKNESS: ICD-10-CM

## 2025-03-20 DIAGNOSIS — Z51.5 ENCOUNTER FOR PALLIATIVE CARE: ICD-10-CM

## 2025-03-20 DIAGNOSIS — N63.0 UNSPECIFIED LUMP IN UNSPECIFIED BREAST: ICD-10-CM

## 2025-03-20 LAB
ANION GAP SERPL CALC-SCNC: 2 MMOL/L — LOW (ref 5–17)
BUN SERPL-MCNC: 17 MG/DL — SIGNIFICANT CHANGE UP (ref 7–23)
CALCIUM SERPL-MCNC: 9.1 MG/DL — SIGNIFICANT CHANGE UP (ref 8.5–10.1)
CHLORIDE SERPL-SCNC: 108 MMOL/L — SIGNIFICANT CHANGE UP (ref 96–108)
CO2 SERPL-SCNC: 30 MMOL/L — SIGNIFICANT CHANGE UP (ref 22–31)
CREAT SERPL-MCNC: 0.68 MG/DL — SIGNIFICANT CHANGE UP (ref 0.5–1.3)
EGFR: 85 ML/MIN/1.73M2 — SIGNIFICANT CHANGE UP
EGFR: 85 ML/MIN/1.73M2 — SIGNIFICANT CHANGE UP
GLUCOSE SERPL-MCNC: 88 MG/DL — SIGNIFICANT CHANGE UP (ref 70–99)
POTASSIUM SERPL-MCNC: 3.7 MMOL/L — SIGNIFICANT CHANGE UP (ref 3.5–5.3)
POTASSIUM SERPL-SCNC: 3.7 MMOL/L — SIGNIFICANT CHANGE UP (ref 3.5–5.3)
SODIUM SERPL-SCNC: 140 MMOL/L — SIGNIFICANT CHANGE UP (ref 135–145)

## 2025-03-20 PROCEDURE — 99232 SBSQ HOSP IP/OBS MODERATE 35: CPT

## 2025-03-20 PROCEDURE — 99223 1ST HOSP IP/OBS HIGH 75: CPT | Mod: FS

## 2025-03-20 RX ORDER — COLLAGENASE CLOSTRIDIUM HIST. 250 UNIT/G
1 OINTMENT (GRAM) TOPICAL
Qty: 0 | Refills: 0 | DISCHARGE
Start: 2025-03-20

## 2025-03-20 RX ORDER — RISPERIDONE 4 MG
1 TABLET ORAL
Qty: 0 | Refills: 0 | DISCHARGE
Start: 2025-03-20

## 2025-03-20 RX ORDER — AMLODIPINE BESYLATE 10 MG/1
1 TABLET ORAL
Qty: 0 | Refills: 0 | DISCHARGE
Start: 2025-03-20

## 2025-03-20 RX ADMIN — Medication 3 MILLIGRAM(S): at 21:29

## 2025-03-20 RX ADMIN — ENOXAPARIN SODIUM 40 MILLIGRAM(S): 100 INJECTION SUBCUTANEOUS at 21:29

## 2025-03-20 RX ADMIN — Medication 0.25 MILLIGRAM(S): at 06:04

## 2025-03-20 RX ADMIN — Medication 1 APPLICATION(S): at 12:48

## 2025-03-20 RX ADMIN — AMLODIPINE BESYLATE 5 MILLIGRAM(S): 10 TABLET ORAL at 06:04

## 2025-03-20 RX ADMIN — Medication 0.25 MILLIGRAM(S): at 18:50

## 2025-03-20 NOTE — CONSULT NOTE ADULT - PROBLEM SELECTOR RECOMMENDATION 9
CT chest with US breast: There is a heterogeneous mass in the 5:00 to 7:00 position of the left breast measuring 2.7 x 2.4 x 3.6 cm. There is an associated linear scar/skin defect adjacent to the mass. Findings are highly concerning for breast mass with associated ulceration and additional imaging in a dedicated breast imaging center should be performed.  per prior [facility] note this admission family did not want to pursue further work up  breast wound, seen by wound care, local care as ordered  consider tylenol PRN for pain, may develop worsening pain which given location may require medication for neuropathic pain in the near future

## 2025-03-20 NOTE — DISCHARGE NOTE NURSING/CASE MANAGEMENT/SOCIAL WORK - NSDCPEFALRISK_GEN_ALL_CORE
For information on Fall & Injury Prevention, visit: [email or web address] OR  [email or web address] OR  https://www.cdc.gov/steadi/patient.html

## 2025-03-20 NOTE — DISCHARGE NOTE PROVIDER - NSDCMRMEDTOKEN_GEN_ALL_CORE_FT
amLODIPine 5 mg oral tablet: 1 tab(s) orally once a day  collagenase 250 units/g topical ointment: 1 Apply topically to affected area once a day  risperiDONE 0.25 mg oral tablet: 1 tab(s) orally 2 times a day as needed for  agitation

## 2025-03-20 NOTE — DISCHARGE NOTE PROVIDER - NSDCFUADDAPPT_GEN_ALL_CORE_FT
APPTS ARE READY TO BE MADE: [X] YES   APPTS ARE READY TO BE MADE: [X] YES    Patient is being transferred. Caregiver will arrange follow up.

## 2025-03-20 NOTE — CONSULT NOTE ADULT - ASSESSMENT
84 year old female PMH of dementia, schizophrenia, and HTN presented to the ED for inability to care for self.  Patient reportedly found in bed covered in feces and urine and door was chained shut.  Patient found to have left breast mass possible malignancy on imaging.  Patient to be discharged to SNF.  Palliative consulted for GOC.

## 2025-03-20 NOTE — DISCHARGE NOTE PROVIDER - NSDCCPCAREPLAN_GEN_ALL_CORE_FT
PRINCIPAL DISCHARGE DIAGNOSIS  Diagnosis: Encounter for social work intervention  Assessment and Plan of Treatment: Discharge Instructions:  Infection Control and Mental Health: Ensure adherence to follow-up appointments. Engage with mental health professionals as needed for support and clarity surrounding mental health diagnoses and treatment.  Social Placement: Continue to work closely with case management and  to arrange an appropriate and safe facility placement. Family should actively participate in the process.  Hypertension Management: Monitor blood pressure at home, considering possible future medication adjustments once compliant. Family should assist in encouraging adherence as possible.  Outpatient Medical Follow-ups: Schedule and attend follow-up for hiatal hernia evaluation and management. Collaborate with primary care providers to monitor renal function and general health.  General Health and Safety: Emphasize a safe living environment, cleanliness, and preventative measures against infections and pressure sores.  Family members, specifically her son in agreement with discharge planning.      SECONDARY DISCHARGE DIAGNOSES  Diagnosis: Encounter for social work intervention  Assessment and Plan of Treatment:      PRINCIPAL DISCHARGE DIAGNOSIS  Diagnosis: Encounter for social work intervention  Assessment and Plan of Treatment: Discharge Instructions:  Infection Control and Mental Health: Ensure adherence to follow-up appointments. Engage with mental health professionals as needed for support and clarity surrounding mental health diagnoses and treatment.  Social Placement: Continue to work closely with case management and  to arrange an appropriate and safe facility placement. Family should actively participate in the process.  Hypertension Management: Monitor blood pressure at home, considering possible future medication adjustments once compliant. Family should assist in encouraging adherence as possible.  Outpatient Medical Follow-ups: Schedule and attend follow-up for hiatal hernia evaluation and management. Collaborate with primary care providers to monitor renal function and general health. Will need outpatient followup for pessary placement.   General Health and Safety: Emphasize a safe living environment, cleanliness, and preventative measures against infections and pressure sores.  Family members, specifically her son in agreement with discharge planning.      SECONDARY DISCHARGE DIAGNOSES  Diagnosis: Encounter for social work intervention  Assessment and Plan of Treatment:

## 2025-03-20 NOTE — PROGRESS NOTE ADULT - ASSESSMENT
Chloe Blackmon is an 84 year old female with PMHx of HTN, paranoid schizophrenia, and dementia who presented to the ED on 3/8/25 for complaints of inability to care for herself and admitted for acute metabolic encephalopathy secondary to UTI.    ##Acute metabolic encephalopathy secondary to UTI  Son reports patient with declining mentation since he last saw her two weeks ago  Baseline mentation is AAO x 2 to person and place, now only AAO x 1 to person only  U/A with proteinuria, ketonuria, large bilirubin, small leuks, positive nitrites, WBC 10, RBC 30, moderate bacteria, squamous epithelial cells present, glucosuria, COVID/influenza/RSV negative  CT head without acute intracranial findings, CXR without infectious etiology but with moderate-sized hiatal hernia. S/p ceftriaxone 1 g IV in the ED. B12 elevated. UCx normal filippo. TSH WNL RPR negative. S/p Abx course. MR Brain WNL. Per psychiatry, does not have schizophrenia.   - Risperdal PRN  - Reorient PRN, avoid sedating meds    #L Breast Mass  High suspicion for malignancy.  Wound care with Xeroform.  Options including biopsy with further possible treatment including ? chemo / RT / surgery d/w son.  Given pt's periodic agitation and known h/o paranoia, pt would be a poor candidate to tolerate further evaluation and treatment.  Previous hospitalist discussed options with Son who is in agreement, wishes to defer any further workup and treatment.  States he has discussed extensively with family. I personally discussed with patient who confirmed deferring workup for now.     #PATRICIO  BUN 25 and Cr 1.32 -> 1.11.   S/p  cc bolus in the ED  Avoid nephrotoxins, renally dose meds  Encourage PO hydration  Monitor renal function    #Social admit  Son reports patient with bouts of paranoid schizophrenia where she will chain up her apartment door so no one can come in. Son did not notice movement on cameras in patient's apartment for a week, used  to cut chains upon arrival to her apartment. Found in bed soiled in urine and feces and apartment is unkempt  Previously declining coming to live with son and daughter-in-law in [place]. Son requesting placement in facility for safety as patient is unable to care for herself. Case management and  consulted for assistance in disposition    #Hiatal hernia, incidental finding  - Will need outpatient f/u, family aware     #HTN, uncontrolled secondary to medication noncompliance  BP as elevated as 198/83, will hold off on initiation of antihypertensives for now given patient refusal to take PO meds  - C/w amlodipine 5/d    #Dementia: not on any PTA meds, turn and reposition q2    Diet: ETC  DVT prophylaxis: lovenox    Dispo: pending placement   Code Status: FC     Discussed care with Son on 03/19    I have spent a total of 42 minutes to prepare to see the patient, obtaining and reviewing history, physical examination, explaining the diagnosis, prognosis and treatment plan with the patient/family/caregiver. I also have spent the time ordering studies and testing, interpreting results, medicine reconciliation, IDRs, subspecialty consultation and documentation as above.

## 2025-03-20 NOTE — DISCHARGE NOTE PROVIDER - ATTENDING DISCHARGE PHYSICAL EXAMINATION:
GENERAL:  NAD, awake, confused.  Oriented to person.  HEENT: NCAT  CARDIOVASCULAR:  S1, S2  LUNGS: CTAB  ABDOMEN:  soft, (-) tenderness, (-) distension, (+) bowel sounds, (-) guarding, (-) rebound (-) rigidity  EXTREMITIES:  no cyanosis / clubbing / edema.   SKIN: L inferior breast open wound with clean base

## 2025-03-20 NOTE — CONSULT NOTE ADULT - SUBJECTIVE AND OBJECTIVE BOX
HPI:  Chloe Blackmon is an 84 year old female with PMHx of HTN, paranoid schizophrenia, and dementia who presented to the ED on 3/8/25 for complaints of inability to care for herself.    History primarily obtained from son, Carlos Blackmon ([phone]) over the phone as patient is unable to provide history due to dementia and AMS. Son reports he last came to visit patient about two weeks ago. At that time, ambulatory unassisted and independent with all ADLs. There are cameras set up in patient's apartment and son noticed there was no movement reported on the cameras for the past week. He attempted to call her home phone but she did not . Therefore, he drove from [place] to her home today. When he arrived, he saw a chain on her apartment door. Son reports patient has bouts of paranoid schizophrenia where she chains up her door so no one can come inside. He cut the chain off with bolt cutters and found patient in bed covered in urine and feces. Son states there is no camera in her bedroom. Of note, patient is very hard of hearing and has a phone for hearing impaired but son states she stopped picking up the phone and refuses to wear hearing aids. Son states patient is supposed to take antihypertensive but refuses to take any medications. In addition, son states patient's apartment is unkempt and smells. Son does not think it is safe for patient to live alone and states she has refused to come live with him at his house so therefore, he would like her placed in a facility for safety. Patient herself is unable to provide any history and just keeps saying, "I do not hear well" despite me shouting in attempt to obtain history from patient.    In the ED, VSS except BP as elevated as 198/83. CBC unremarkable. BUN 25, Cr 1.32, Tbili 1.6. U/A with proteinuria, ketonuria, large bilirubin, small leuks, positive nitrites, WBC 10, RBC 30, moderate bacteria, squamous epithelial cells present, glucosuria. COVID/influenza/RSV negative. CT head without acute intracranial findings. CXR with moderate-sized hiatal hernia. Received  cc bolus and ceftriaxone 1 g IV. (08 Mar 2025 20:20)    PERTINENT PM/SXH:   Essential hypertension    HTN (hypertension)    Paranoid schizophrenia    Dementia      No significant past surgical history      FAMILY HISTORY:    ITEMS NOT CHECKED ARE NOT PRESENT    SOCIAL HISTORY:   Significant other/partner:  [ ]  Children: yes [ ]  Anabaptist/Spirituality:  Substance hx:  [ ]   Tobacco hx:  [ ]   Alcohol hx: [ ]   Home Opioid hx:  [ ] I-Stop Reference No: Reference #: [id number]  Living Situation: [x ]Home  [ ]Long term care  [ ]Rehab [ ]Other    ADVANCE DIRECTIVES:    DNR  MOLST  [ ]  Living Will  [ ]   DECISION MAKER(s):  [ ] Health Care Proxy(s)  [x ] Surrogate(s)  [ ] Guardian           Name(s): Phone Number(s): Carlos Blackmon (son) [phone]    BASELINE (I)ADL(s) (prior to admission):  [place]: [ ]Total  [ ] Moderate [ ]Dependent    Allergies    No Known Allergies    Intolerances    MEDICATIONS  (STANDING):  amLODIPine   Tablet 5 milliGRAM(s) Oral daily  collagenase Ointment 1 Application(s) Topical daily  enoxaparin Injectable 40 milliGRAM(s) SubCutaneous every 24 hours  lactated ringers. 1000 milliLiter(s) (75 mL/Hr) IV Continuous <Continuous>  risperiDONE   Tablet 0.25 milliGRAM(s) Oral two times a day    MEDICATIONS  (PRN):  acetaminophen     Tablet .. 650 milliGRAM(s) Oral every 6 hours PRN Temp greater or equal to 38C (100.4F), Mild Pain (1 - 3)  aluminum hydroxide/magnesium hydroxide/simethicone Suspension 30 milliLiter(s) Oral every 4 hours PRN Dyspepsia  melatonin 3 milliGRAM(s) Oral at bedtime PRN Insomnia  ondansetron Injectable 4 milliGRAM(s) IV Push every 8 hours PRN Nausea and/or Vomiting    PRESENT SYMPTOMS: [ x]Unable to obtain due to poor mentation   Source if other than patient:  [ ]Family   [ ]Team     Pain: [ ] yes [ ] no  QOL impact -   Location -                    Aggravating factors -  Quality -  Radiation -  Timing-  Severity (0-10 scale):  Minimal acceptable level (0-10 scale):     PAIN AD Score:     [email or web address] (press ctrl +  left click to view)    Dyspnea:                           [ ]Mild [ ]Moderate [ ]Severe  Anxiety:                             [ ]Mild [ ]Moderate [ ]Severe  Fatigue:                             [ ]Mild [ ]Moderate [ ]Severe  Nausea:                             [ ]Mild [ ]Moderate [ ]Severe  Loss of appetite:              [ ]Mild [ ]Moderate [ ]Severe  Constipation:                    [ ]Mild [ ]Moderate [ ]Severe    Other Symptoms:  [ ]All other review of systems negative     Karnofsky Performance Score/Palliative Performance Status Version 2:         %    [email or web address]_[email or web address]  PHYSICAL EXAM:  Vital Signs Last 24 Hrs  T(C): 36.8 (20 Mar 2025 10:55), Max: 37.4 (19 Mar 2025 23:51)  T(F): 98.3 (20 Mar 2025 10:55), Max: 99.3 (19 Mar 2025 23:51)  HR: 94 (20 Mar 2025 10:55) (68 - 94)  BP: 97/63 (20 Mar 2025 10:55) (97/63 - 126/69)  BP(mean): --  RR: 18 (20 Mar 2025 10:55) (18 - 18)  SpO2: 98% (20 Mar 2025 10:55) (96% - 98%)    Parameters below as of 20 Mar 2025 10:55  Patient On (Oxygen Delivery Method): room air     I&O's Summary    19 Mar 2025 07:01  -  20 Mar 2025 07:00  --------------------------------------------------------  IN: 200 mL / OUT: 0 mL / NET: 200 mL    GENERAL:  [x ]Alert  [ ]Oriented x   [ ]Lethargic  [ ]Cachexia  [ ]Unarousable  [ x]Verbal  [ ]Non-Verbal  Behavioral:   [ ] Anxiety  [ ] Delirium [ ] Agitation [ ] Other  HEENT:  [ ]Normal   [ ]Dry mouth   [ ]ET Tube/Trach  [ ]Oral lesions  PULMONARY:   [ x]Clear [ ]Tachypnea  [ ]Audible excessive secretions   [ ]Rhonchi        [ ]Right [ ]Left [ ]Bilateral  [ ]Crackles        [ ]Right [ ]Left [ ]Bilateral  [ ]Wheezing     [ ]Right [ ]Left [ ]Bilateral  CARDIOVASCULAR:    [ ]Regular [ ]Irregular [ ]Tachy  [ ]Jensen [ ]Murmur [ ]Other  GASTROINTESTINAL:  [x ]Soft  [ ]Distended   [ ]+BS  [ x]Non tender [ ]Tender  [ ]PEG [ ]OGT/ NGT  Last BM: 3/18/25  GENITOURINARY:  [ ]Normal [ x] Incontinent   [ ]Oliguria/Anuria   [ ][place]  MUSCULOSKELETAL:   [ ]Normal   [x ]Weakness  [ ]Bed/Wheelchair bound [ ]Edema  NEUROLOGIC:   [ ]No focal deficits  [ x] Cognitive impairment  [ ] Dysphagia [ ]Dysarthria [ ] Paresis [ ]Other   SKIN:   [ ]Normal   [x ]Pressure ulcer(s) left breast wound per flow sheet  [ ]Rash    CRITICAL CARE:  [ ] Shock Present  [ ]Septic [ ]Cardiogenic [ ]Neurologic [ ]Hypovolemic  [ ]  Vasopressors [ ]  Inotropes   [ ] Respiratory failure present [ ] mechanical ventilation [ ] non-invasive ventilatory support [ ] High flow  [ ] Acute  [ ] Chronic [ ] Hypoxic  [ ] Hypercarbic [ ] Other  [ ] Other organ failure     LABS:                        10.6   2.70  )-----------( 155      ( 19 Mar 2025 07:49 )             32.2   03-20    140  |  108  |  17  ----------------------------<  88  3.7   |  30  |  0.68    Ca    9.1      20 Mar 2025 07:20    Culture - Urine (03.08.25 @ 17:20)    Specimen Source: Clean Catch   Culture Results:   <10,000 CFU/mL Normal Urogenital Ermelinda    Urinalysis with Rflx Culture (03.08.25 @ 17:20)    Urine Appearance: Cloudy   Color: Orange   Specific Gravity: >1.030   pH Urine: 6.0   Protein, Urine: 300 mg/dL   Glucose Qualitative, Urine: 100 mg/dL   Ketone - Urine: 80 mg/dL   Blood, Urine: Non Hemolyzed Trace   Bilirubin: Large   Urobilinogen: 1.0 mg/dL   Leukocyte Esterase Concentration: Small   Nitrite: Positive    RADIOLOGY & ADDITIONAL STUDIES:  < from: CT Head No Cont (03.14.25 @ 15:33) >  IMPRESSION: No acute intracranial hemorrhage, mass effect, or shift of   the midline structures.  Mild chronic white matter microvascular type changes which appears   similar.  --- End of Report ---  < end of copied text >    < from: US Breast Limited, Left (03.12.25 @ 14:20) >  IMPRESSION: There is a heterogeneous mass in the 5:00 to 7:00 position of   the left breast measuring 2.7 x 2.4 x 3.6 cm. There is an associated   linear scar/skin defect adjacent to the mass. No fluid collection   identified.  ?  Findings are highly concerning for breast mass with associated ulceration   and additional imaging in a dedicated breast imaging center should be   performed.  Above findings were discussed with Dr. ASHVIN BROWN on 3/12/2025 2:41 PM.  --- End of Report ---  < end of copied text >    < from: MR Head No Cont (03.12.25 @ 14:20) >  IMPRESSION: No acute infarction.  --- End of Report ---  < end of copied text >    < from: CT Chest No Cont (03.10.25 @ 14:42) >  IMPRESSION:  Skin defect involving the lower aspect of the left breast corresponding   to the history of breast wound with an associated left breast   intraparenchymal contiguous 3.4 x 2.2 cm mass/fluid collection which is   not with a evaluated due to lack of intravenous contrast. Differential   diagnostic considerations include soft tissue mass or abscess. Focused   ultrasound is recommended for further evaluation.  Nonspecific 5 mm right lower lobe nodule.  --- End of Report ---  < end of copied text >    < from: CT Head No Cont (03.08.25 @ 16:27) >  IMPRESSION:  No evidence of acute intracranial hemorrhage or midline shift.  --- End of Report ---  < end of copied text >    < from: Xray Chest 2 Views PA/Lat (03.08.25 @ 14:20) >  IMPRESSION: The heart size, mediastinal and hilar contours are unchanged   and within normal limits. Again noted is a moderate-sized hiatal hernia.   Lung zones are clear. Soft tissues and osseous structures are intact.  --- End of Report ---  < end of copied text >    PROTEIN CALORIE MALNUTRITION PRESENT: [ ] Yes [ ] No  [ ] PPSV2 < or = to 30% [ ] significant weight loss  [ ] poor nutritional intake [ ] catabolic state [ ] anasarca     Artificial Nutrition [ ]     REFERRALS:   [ ]Chaplaincy  [ ] Hospice  [ ]Child Life  [ ]Social Work  [ ]Case management [ ]Holistic Therapy     Goals of Care Document:   Care Coordination Assessment 201 [C. Provider] (03-11-25 @ 12:31)   Progress Notes - Care Coordination [C. Provider] (03-20-25 @ 12:43)   HPI:  Chloe Blackmon is an 84 year old female with PMHx of HTN, paranoid schizophrenia, and dementia who presented to the ED on 3/8/25 for complaints of inability to care for herself.    History primarily obtained from son, Carlos Blackmon ([phone]) over the phone as patient is unable to provide history due to dementia and AMS. Son reports he last came to visit patient about two weeks ago. At that time, ambulatory unassisted and independent with all ADLs. There are cameras set up in patient's apartment and son noticed there was no movement reported on the cameras for the past week. He attempted to call her home phone but she did not . Therefore, he drove from [place] to her home today. When he arrived, he saw a chain on her apartment door. Son reports patient has bouts of paranoid schizophrenia where she chains up her door so no one can come inside. He cut the chain off with bolt cutters and found patient in bed covered in urine and feces. Son states there is no camera in her bedroom. Of note, patient is very hard of hearing and has a phone for hearing impaired but son states she stopped picking up the phone and refuses to wear hearing aids. Son states patient is supposed to take antihypertensive but refuses to take any medications. In addition, son states patient's apartment is unkempt and smells. Son does not think it is safe for patient to live alone and states she has refused to come live with him at his house so therefore, he would like her placed in a facility for safety. Patient herself is unable to provide any history and just keeps saying, "I do not hear well" despite me shouting in attempt to obtain history from patient.    In the ED, VSS except BP as elevated as 198/83. CBC unremarkable. BUN 25, Cr 1.32, Tbili 1.6. U/A with proteinuria, ketonuria, large bilirubin, small leuks, positive nitrites, WBC 10, RBC 30, moderate bacteria, squamous epithelial cells present, glucosuria. COVID/influenza/RSV negative. CT head without acute intracranial findings. CXR with moderate-sized hiatal hernia. Received  cc bolus and ceftriaxone 1 g IV. (08 Mar 2025 20:20)    PERTINENT PM/SXH:   Essential hypertension    HTN (hypertension)    Paranoid schizophrenia    Dementia      No significant past surgical history      FAMILY HISTORY:    ITEMS NOT CHECKED ARE NOT PRESENT    SOCIAL HISTORY:   Significant other/partner:  [ ]  Children: yes [ ]  Voodoo/Spirituality:  Substance hx:  [ ]   Tobacco hx:  [ ]   Alcohol hx: [ ]   Home Opioid hx:  [ ] I-Stop Reference No: Reference #: [id number]  Living Situation: [x ]Home  [ ]Long term care  [ ]Rehab [ ]Other    ADVANCE DIRECTIVES:    DNR  MOLST  [ ]  Living Will  [ ]   DECISION MAKER(s):  [ ] Health Care Proxy(s)  [x ] Surrogate(s)  [ ] Guardian           Name(s): Phone Number(s): Carlos Blackmon (son) [phone]    BASELINE (I)ADL(s) (prior to admission):  [place]: [ x]Total  [ ] Moderate [ ]Dependent    Allergies    No Known Allergies    Intolerances    MEDICATIONS  (STANDING):  amLODIPine   Tablet 5 milliGRAM(s) Oral daily  collagenase Ointment 1 Application(s) Topical daily  enoxaparin Injectable 40 milliGRAM(s) SubCutaneous every 24 hours  lactated ringers. 1000 milliLiter(s) (75 mL/Hr) IV Continuous <Continuous>  risperiDONE   Tablet 0.25 milliGRAM(s) Oral two times a day    MEDICATIONS  (PRN):  acetaminophen     Tablet .. 650 milliGRAM(s) Oral every 6 hours PRN Temp greater or equal to 38C (100.4F), Mild Pain (1 - 3)  aluminum hydroxide/magnesium hydroxide/simethicone Suspension 30 milliLiter(s) Oral every 4 hours PRN Dyspepsia  melatonin 3 milliGRAM(s) Oral at bedtime PRN Insomnia  ondansetron Injectable 4 milliGRAM(s) IV Push every 8 hours PRN Nausea and/or Vomiting    PRESENT SYMPTOMS: [ x]Unable to obtain due to poor mentation   Source if other than patient:  [ ]Family   [ ]Team     Pain: [ ] yes [ ] no  QOL impact -   Location -                    Aggravating factors -  Quality -  Radiation -  Timing-  Severity (0-10 scale):  Minimal acceptable level (0-10 scale):     PAIN AD Score:     [email or web address]/painad.pdf (press ctrl +  left click to view)    Dyspnea:                           [ ]Mild [ ]Moderate [ ]Severe  Anxiety:                             [ ]Mild [ ]Moderate [ ]Severe  Fatigue:                             [ ]Mild [ ]Moderate [ ]Severe  Nausea:                             [ ]Mild [ ]Moderate [ ]Severe  Loss of appetite:              [ ]Mild [ ]Moderate [ ]Severe  Constipation:                    [ ]Mild [ ]Moderate [ ]Severe    Other Symptoms:  [ ]All other review of systems negative     Karnofsky Performance Score/Palliative Performance Status Version 2:         %    [email or web address]_[email or web address]  PHYSICAL EXAM:  Vital Signs Last 24 Hrs  T(C): 36.8 (20 Mar 2025 10:55), Max: 37.4 (19 Mar 2025 23:51)  T(F): 98.3 (20 Mar 2025 10:55), Max: 99.3 (19 Mar 2025 23:51)  HR: 94 (20 Mar 2025 10:55) (68 - 94)  BP: 97/63 (20 Mar 2025 10:55) (97/63 - 126/69)  BP(mean): --  RR: 18 (20 Mar 2025 10:55) (18 - 18)  SpO2: 98% (20 Mar 2025 10:55) (96% - 98%)    Parameters below as of 20 Mar 2025 10:55  Patient On (Oxygen Delivery Method): room air     I&O's Summary    19 Mar 2025 07:01  -  20 Mar 2025 07:00  --------------------------------------------------------  IN: 200 mL / OUT: 0 mL / NET: 200 mL    GENERAL:  [x ]Alert  [ ]Oriented x   [ ]Lethargic  [ ]Cachexia  [ ]Unarousable  [ x]Verbal  [ ]Non-Verbal  Behavioral:   [ ] Anxiety  [ ] Delirium [ ] Agitation [ ] Other  HEENT:  [ ]Normal   [ ]Dry mouth   [ ]ET Tube/Trach  [ ]Oral lesions  PULMONARY:   [ x]Clear [ ]Tachypnea  [ ]Audible excessive secretions   [ ]Rhonchi        [ ]Right [ ]Left [ ]Bilateral  [ ]Crackles        [ ]Right [ ]Left [ ]Bilateral  [ ]Wheezing     [ ]Right [ ]Left [ ]Bilateral  CARDIOVASCULAR:    [ ]Regular [ ]Irregular [ ]Tachy  [ ]Jensen [ ]Murmur [ ]Other  GASTROINTESTINAL:  [x ]Soft  [ ]Distended   [ ]+BS  [ x]Non tender [ ]Tender  [ ]PEG [ ]OGT/ NGT  Last BM: 3/18/25  GENITOURINARY:  [ ]Normal [ x] Incontinent   [ ]Oliguria/Anuria   [ ][place]  MUSCULOSKELETAL:   [ ]Normal   [x ]Weakness  [ ]Bed/Wheelchair bound [ ]Edema  NEUROLOGIC:   [ ]No focal deficits  [ x] Cognitive impairment  [ ] Dysphagia [ ]Dysarthria [ ] Paresis [ ]Other   SKIN:   [ ]Normal   [x ]Pressure ulcer(s) left breast wound per flow sheet  [ ]Rash    CRITICAL CARE:  [ ] Shock Present  [ ]Septic [ ]Cardiogenic [ ]Neurologic [ ]Hypovolemic  [ ]  Vasopressors [ ]  Inotropes   [ ] Respiratory failure present [ ] mechanical ventilation [ ] non-invasive ventilatory support [ ] High flow  [ ] Acute  [ ] Chronic [ ] Hypoxic  [ ] Hypercarbic [ ] Other  [ ] Other organ failure     LABS:                        10.6   2.70  )-----------( 155      ( 19 Mar 2025 07:49 )             32.2   03-20    140  |  108  |  17  ----------------------------<  88  3.7   |  30  |  0.68    Ca    9.1      20 Mar 2025 07:20    Culture - Urine (03.08.25 @ 17:20)    Specimen Source: Clean Catch   Culture Results:   <10,000 CFU/mL Normal Urogenital Ermelinda    Urinalysis with Rflx Culture (03.08.25 @ 17:20)    Urine Appearance: Cloudy   Color: Orange   Specific Gravity: >1.030   pH Urine: 6.0   Protein, Urine: 300 mg/dL   Glucose Qualitative, Urine: 100 mg/dL   Ketone - Urine: 80 mg/dL   Blood, Urine: Non Hemolyzed Trace   Bilirubin: Large   Urobilinogen: 1.0 mg/dL   Leukocyte Esterase Concentration: Small   Nitrite: Positive    RADIOLOGY & ADDITIONAL STUDIES:  < from: CT Head No Cont (03.14.25 @ 15:33) >  IMPRESSION: No acute intracranial hemorrhage, mass effect, or shift of   the midline structures.  Mild chronic white matter microvascular type changes which appears   similar.  --- End of Report ---  < end of copied text >    < from: US Breast Limited, Left (03.12.25 @ 14:20) >  IMPRESSION: There is a heterogeneous mass in the 5:00 to 7:00 position of   the left breast measuring 2.7 x 2.4 x 3.6 cm. There is an associated   linear scar/skin defect adjacent to the mass. No fluid collection   identified.  ?  Findings are highly concerning for breast mass with associated ulceration   and additional imaging in a dedicated breast imaging center should be   performed.  Above findings were discussed with Dr. ASHVIN BROWN on 3/12/2025 2:41 PM.  --- End of Report ---  < end of copied text >    < from: MR Head No Cont (03.12.25 @ 14:20) >  IMPRESSION: No acute infarction.  --- End of Report ---  < end of copied text >    < from: CT Chest No Cont (03.10.25 @ 14:42) >  IMPRESSION:  Skin defect involving the lower aspect of the left breast corresponding   to the history of breast wound with an associated left breast   intraparenchymal contiguous 3.4 x 2.2 cm mass/fluid collection which is   not with a evaluated due to lack of intravenous contrast. Differential   diagnostic considerations include soft tissue mass or abscess. Focused   ultrasound is recommended for further evaluation.  Nonspecific 5 mm right lower lobe nodule.  --- End of Report ---  < end of copied text >    < from: CT Head No Cont (03.08.25 @ 16:27) >  IMPRESSION:  No evidence of acute intracranial hemorrhage or midline shift.  --- End of Report ---  < end of copied text >    < from: Xray Chest 2 Views PA/Lat (03.08.25 @ 14:20) >  IMPRESSION: The heart size, mediastinal and hilar contours are unchanged   and within normal limits. Again noted is a moderate-sized hiatal hernia.   Lung zones are clear. Soft tissues and osseous structures are intact.  --- End of Report ---  < end of copied text >    PROTEIN CALORIE MALNUTRITION PRESENT: [ ] Yes [ ] No  [ ] PPSV2 < or = to 30% [ ] significant weight loss  [ ] poor nutritional intake [ ] catabolic state [ ] anasarca     Artificial Nutrition [ ]     REFERRALS:   [ ]Chaplaincy  [ ] Hospice  [ ]Child Life  [ ]Social Work  [ ]Case management [ ]Holistic Therapy     Goals of Care Document:   Care Coordination Assessment 201 [C. Provider] (03-11-25 @ 12:31)   Progress Notes - Care Coordination [C. Provider] (03-20-25 @ 12:43)

## 2025-03-20 NOTE — CONSULT NOTE ADULT - PROBLEM SELECTOR RECOMMENDATION 4
was independent prior per chart review  OOB as able and assist with ADLs  PT with reccs for LIMA was independent prior per chart review  OOB as able and assist with ADLs  PT with reccs for LIMA, to go to [facility] today

## 2025-03-20 NOTE — CONSULT NOTE ADULT - PROBLEM SELECTOR RECOMMENDATION 3
reportedly living alone and ambulatory prior to admission  was found to be in urine and feces  will need more help moving forward with plan for SNF placement at [facility]  promote sleep/wake cycles, family presence and cluster care reportedly living alone and ambulatory prior to admission  was found to be in urine and feces  CTH with no acute findings and urine culture not significant, did receive course of ceftriaxone  will need more help moving forward with plan for SNF placement at [facility]  promote sleep/wake cycles, family presence and cluster care

## 2025-03-20 NOTE — DISCHARGE NOTE NURSING/CASE MANAGEMENT/SOCIAL WORK - PATIENT PORTAL LINK FT
You can access the FollowMyHealth Patient Portal offered by [facility] by registering at the following website: [email or web address]. By joining [employer]’s FollowMyHealth portal, you will also be able to view your health information using other applications (apps) compatible with our system.

## 2025-03-20 NOTE — DISCHARGE NOTE PROVIDER - HOSPITAL COURSE
#Acute Metabolic Encephalopathy secondary to UTI  Chloe Blackmon was admitted with declining mentation, suspected due to a urinary tract infection (UTI). Her baseline was alert and oriented to person and place (AAO x 2), but she was now only oriented to person. Urinalysis showed proteinuria, ketonuria, positive nitrites, and moderate bacteria, suggestive of a UTI. Imaging studies (CT head and MRI brain) showed no acute intracranial findings. She received ceftriaxone 1 g IV in the ED and completed the antibiotic course. UCx indicated normal filippo, and further infectious panels were negative. Elevated B12 levels were noted without clinical significance. Psychiatry confirmed Chloe does not have schizophrenia. Risperdal was administered PRN to manage agitation, with reorientation and avoidance of sedating medications emphasized.    #Left Breast Mass  There is a high suspicion for malignancy in a left breast mass. Due to her periodic agitation and history of paranoia, Chloe is deemed unlikely to tolerate further evaluation or treatment. Discussions were held with her son, who agreed to defer workup and treatment. This decision was confirmed by the patient herself.    #Acute Kidney Injury (PATRICIO)  Initial BUN was 25 and creatinine 1.32, which improved to 1.11 following a 500 cc normal saline bolus in the ED. Nephrotoxic medications were avoided, and renal dosing of medications was ensured. Encouragement for oral hydration was provided, and renal function monitoring was continued.    #Social Considerations  Chloe was admitted due to self-care incapacities characterized by paranoid behavior, leading her to chain her apartment door. She was discovered in poor sanitary conditions. Her son reported declining interest in living with him and has requested placement in a facility for safety. Case management and social work were engaged for assistance in facilitating placement.    #Hiatal Hernia  An incidental finding of a moderate-sized hiatal hernia on CXR was noted, requiring outpatient follow-up. The family was informed and acknowledged responsibility for follow-up.    # Hypertension, Uncontrolled  Blood pressure was elevated, reaching 198/83, attributed to medication noncompliance. Given the patient's refusal to take oral medications, initiation of antihypertensive therapy was postponed. Amlodipine 5 mg daily was continued.    #Dementia  No medications were previously administered for dementia. Care recommendations included turning and repositioning every two hours to prevent pressure sores.    Discharge Instructions:    Infection Control and Mental Health: Ensure adherence to follow-up appointments. Engage with mental health professionals as needed for support and clarity surrounding mental health diagnoses and treatment.    Social Placement: Continue to work closely with case management and  to arrange an appropriate and safe facility placement. Family should actively participate in the process.    Hypertension Management: Monitor blood pressure at home, considering possible future medication adjustments once compliant. Family should assist in encouraging adherence as possible.    Outpatient Medical Follow-ups: Schedule and attend follow-up for hiatal hernia evaluation and management. Collaborate with primary care providers to monitor renal function and general health.    General Health and Safety: Emphasize a safe living environment, cleanliness, and preventative measures against infections and pressure sores.    Family members, specifically her son in agreement with discharge planning. #Acute Metabolic Encephalopathy secondary to UTI  Chloe Blackmon was admitted with declining mentation, suspected due to a urinary tract infection (UTI). Her baseline was alert and oriented to person and place (AAO x 2), but she was now only oriented to person. Urinalysis showed proteinuria, ketonuria, positive nitrites, and moderate bacteria, suggestive of a UTI. Imaging studies (CT head and MRI brain) showed no acute intracranial findings. She received ceftriaxone 1 g IV in the ED and completed the antibiotic course. UCx indicated normal filippo, and further infectious panels were negative. Elevated B12 levels were noted without clinical significance. Psychiatry confirmed Chloe does not have schizophrenia. Risperdal was administered PRN to manage agitation, with reorientation and avoidance of sedating medications emphasized.    #Left Breast Mass  There is a high suspicion for malignancy in a left breast mass. Due to her periodic agitation and history of paranoia, Chloe is deemed unlikely to tolerate further evaluation or treatment. Discussions were held with her son, who agreed to defer workup and treatment. This decision was confirmed by the patient herself. Local wound care while in rehab. Wound will need to be managed by outpatient Wound care.     #Acute Kidney Injury (PATRICIO)  Initial BUN was 25 and creatinine 1.32, which improved to 1.11 following a 500 cc normal saline bolus in the ED. Nephrotoxic medications were avoided, and renal dosing of medications was ensured. Encouragement for oral hydration was provided, and renal function monitoring was continued.    #Social Considerations  Chloe was admitted due to self-care incapacities characterized by paranoid behavior, leading her to chain her apartment door. She was discovered in poor sanitary conditions. Her son reported declining interest in living with him and has requested placement in a facility for safety. Case management and social work were engaged for assistance in facilitating placement.    #Hiatal Hernia  An incidental finding of a moderate-sized hiatal hernia on CXR was noted, requiring outpatient follow-up. The family was informed and acknowledged responsibility for follow-up.    # Hypertension, Uncontrolled  Blood pressure was elevated, reaching 198/83, attributed to medication noncompliance. Given the patient's refusal to take oral medications, initiation of antihypertensive therapy was postponed. Amlodipine 5 mg daily was continued.    #Dementia  No medications were previously administered for dementia. Care recommendations included turning and repositioning every two hours to prevent pressure sores.    #Uterine prolapse  Noted by nursing. Gyn evaluated. Patient is urinating appropriately so no indication for estrada at this time. Will need outpatient followup for pessary placement.     Discharge Instructions:    Infection Control and Mental Health: Ensure adherence to follow-up appointments. Engage with mental health professionals as needed for support and clarity surrounding mental health diagnoses and treatment.    Social Placement: Continue to work closely with case management and  to arrange an appropriate and safe facility placement. Family should actively participate in the process.    Hypertension Management: Monitor blood pressure at home, considering possible future medication adjustments once compliant. Family should assist in encouraging adherence as possible.    Outpatient Medical Follow-ups: Schedule and attend follow-up for hiatal hernia evaluation and management. Collaborate with primary care providers to monitor renal function and general health.    General Health and Safety: Emphasize a safe living environment, cleanliness, and preventative measures against infections and pressure sores.    Family members, specifically her son in agreement with discharge planning.

## 2025-03-20 NOTE — DISCHARGE NOTE PROVIDER - NSFOLLOWUPCLINICS_GEN_ALL_ED_FT
[facility] Gynecology and Obstetrics  Gynceology/OB  [address] [place] [address]  Phone: [phone]  Fax:   Follow Up Time: 1 week     [facility] Gynecology and Obstetrics  Gynceology/OB  [address] [place] [address]  Phone: [phone]  Fax:   Follow Up Time: 1 week    [facility]  Wound Care  [address] [place] [id number]  Phone: [phone]  Fax: [phone]  Follow Up Time: 2 weeks

## 2025-03-20 NOTE — CONSULT NOTE ADULT - PROBLEM SELECTOR RECOMMENDATION 5
Called patient's son, Carlos [phone] and left VM.  Patient high risk for further decline and hospitalizations.  Recommend further conversation surrounding GOC.     Message sent to Dr. Melgar

## 2025-03-20 NOTE — CONSULT NOTE ADULT - NS ATTEND AMEND GEN_ALL_CORE FT
84 y F hx of dementia, schizophrenia, and HTN adm after being found in unkempt condition at home by son, found in bed covered in urine and feces, concern for safety as pt lives alone. Hx of noncompliance w meds. Also found to have L breast mass on imaging, concerning for malignancy. Per report, family did not want further workup. Palliative will follow for GOC discussion, unable to reach family today. For LIMA.

## 2025-03-20 NOTE — DISCHARGE NOTE NURSING/CASE MANAGEMENT/SOCIAL WORK - FINANCIAL ASSISTANCE
[facility] provides services at a reduced cost to those who are determined to be eligible through [facility]’s financial assistance program. Information regarding [facility]’s financial assistance program can be found by going to [email or web address] or by calling [phone].

## 2025-03-21 PROCEDURE — 99239 HOSP IP/OBS DSCHRG MGMT >30: CPT

## 2025-03-21 PROCEDURE — 99221 1ST HOSP IP/OBS SF/LOW 40: CPT

## 2025-03-21 PROCEDURE — 99232 SBSQ HOSP IP/OBS MODERATE 35: CPT | Mod: FS

## 2025-03-21 RX ADMIN — AMLODIPINE BESYLATE 5 MILLIGRAM(S): 10 TABLET ORAL at 06:07

## 2025-03-21 RX ADMIN — Medication 0.25 MILLIGRAM(S): at 18:51

## 2025-03-21 RX ADMIN — ENOXAPARIN SODIUM 40 MILLIGRAM(S): 100 INJECTION SUBCUTANEOUS at 22:18

## 2025-03-21 RX ADMIN — Medication 0.25 MILLIGRAM(S): at 06:07

## 2025-03-21 RX ADMIN — Medication 1 APPLICATION(S): at 11:27

## 2025-03-21 NOTE — CHART NOTE - NSCHARTNOTEFT_GEN_A_CORE
URSULA BLACKMON  MRN-[id number]  Patient is a 84y old  Female who presents with a chief complaint of Acute metabolic encephalopathy secondary to UTI (20 Mar 2025 14:27)    HPI:  Ursula Blackmon is an 84 year old female with PMHx of HTN, paranoid schizophrenia, and dementia who presented to the ED on 3/8/25 for complaints of inability to care for herself.    History primarily obtained from son, Carlos Blackmon ([phone]) over the phone as patient is unable to provide history due to dementia and AMS. Son reports he last came to visit patient about two weeks ago. At that time, ambulatory unassisted and independent with all ADLs. There are cameras set up in patient's apartment and son noticed there was no movement reported on the cameras for the past week. He attempted to call her home phone but she did not . Therefore, he drove from [place] to her home today. When he arrived, he saw a chain on her apartment door. Son reports patient has bouts of paranoid schizophrenia where she chains up her door so no one can come inside. He cut the chain off with bolt cutters and found patient in bed covered in urine and feces. Son states there is no camera in her bedroom. Of note, patient is very hard of hearing and has a phone for hearing impaired but son states she stopped picking up the phone and refuses to wear hearing aids. Son states patient is supposed to take antihypertensive but refuses to take any medications. In addition, son states patient's apartment is unkempt and smells. Son does not think it is safe for patient to live alone and states she has refused to come live with him at his house so therefore, he would like her placed in a facility for safety. Patient herself is unable to provide any history and just keeps saying, "I do not hear well" despite me shouting in attempt to obtain history from patient.    In the ED, VSS except BP as elevated as 198/83. CBC unremarkable. BUN 25, Cr 1.32, Tbili 1.6. U/A with proteinuria, ketonuria, large bilirubin, small leuks, positive nitrites, WBC 10, RBC 30, moderate bacteria, squamous epithelial cells present, glucosuria. COVID/influenza/RSV negative. CT head without acute intracranial findings. CXR with moderate-sized hiatal hernia. Received  cc bolus and ceftriaxone 1 g IV. (08 Mar 2025 20:20)      24 HOUR EVENTS: Called by RN stating patient went to use the bathroom and noticed uterine prolapse.      ICU Vital Signs Last 24 Hrs  T(C): 36.6 (21 Mar 2025 16:50), Max: 36.8 (20 Mar 2025 23:42)  T(F): 97.8 (21 Mar 2025 16:50), Max: 98.2 (20 Mar 2025 23:42)  HR: 85 (21 Mar 2025 16:50) (85 - 108)  BP: 131/69 (21 Mar 2025 16:50) (128/78 - 154/64)  RR: 18 (21 Mar 2025 16:50) (18 - 18)  SpO2: 94% (21 Mar 2025 16:50) (94% - 98%)    O2 Parameters below as of 21 Mar 2025 16:50  Patient On (Oxygen Delivery Method): room air        ROS:   Negative for 12 body systems unless otherwise specified in HPI    Physical Exam  General: NAD (lethargic/arousable/obtunded/awake)  HEENT: atraumatic, PERRL, sclera non-icteric, moist mucous membranes, nares patent  Chest/Lungs: CTAB, non labored breathing, good air entry, No respiratory distress (No wheeze/Rales/Rhonchi)  Heart: RRR  Abd: Soft, nontender nondistended, BS present. No rebound tenderness or guarding  Extremities: Normal pulses,  No edema, normal capillary refill   Skin: warm, dry, appropriate for ethnicity. (Nailbeds pink with no cyanosis or clubbin)  Neuro:A&O x ____ (name/place/time), concentration? focal neuro/sensory deficits?      Imaging Personally Reviewed:  [ ] YES  [ ] NO  Consultant(s) Notes Reviewed:  [ ] YES  [ ] NO    Assessment/Plan:   - GYN consulted  -   -   -       Case Discussed with Dr. Melgar     40 mins assessing presenting problems of acute illness. Medical decision making including Initiating plan of care, reviewing data, reviewing radiology, discussing with multidisciplinary team, non inclusive of procedures, discussing goals of care with patient/family URSULA BLACKMON  MRN-[id number]  Patient is a 84y old  Female who presents with a chief complaint of Acute metabolic encephalopathy secondary to UTI (20 Mar 2025 14:27)    HPI:  Ursula Blackmon is an 84 year old female with PMHx of HTN, paranoid schizophrenia, and dementia who presented to the ED on 3/8/25 for complaints of inability to care for herself.    History primarily obtained from son, Carlos Blackmon ([phone]) over the phone as patient is unable to provide history due to dementia and AMS. Son reports he last came to visit patient about two weeks ago. At that time, ambulatory unassisted and independent with all ADLs. There are cameras set up in patient's apartment and son noticed there was no movement reported on the cameras for the past week. He attempted to call her home phone but she did not . Therefore, he drove from [place] to her home today. When he arrived, he saw a chain on her apartment door. Son reports patient has bouts of paranoid schizophrenia where she chains up her door so no one can come inside. He cut the chain off with bolt cutters and found patient in bed covered in urine and feces. Son states there is no camera in her bedroom. Of note, patient is very hard of hearing and has a phone for hearing impaired but son states she stopped picking up the phone and refuses to wear hearing aids. Son states patient is supposed to take antihypertensive but refuses to take any medications. In addition, son states patient's apartment is unkempt and smells. Son does not think it is safe for patient to live alone and states she has refused to come live with him at his house so therefore, he would like her placed in a facility for safety. Patient herself is unable to provide any history and just keeps saying, "I do not hear well" despite me shouting in attempt to obtain history from patient.    In the ED, VSS except BP as elevated as 198/83. CBC unremarkable. BUN 25, Cr 1.32, Tbili 1.6. U/A with proteinuria, ketonuria, large bilirubin, small leuks, positive nitrites, WBC 10, RBC 30, moderate bacteria, squamous epithelial cells present, glucosuria. COVID/influenza/RSV negative. CT head without acute intracranial findings. CXR with moderate-sized hiatal hernia. Received  cc bolus and ceftriaxone 1 g IV. (08 Mar 2025 20:20)      24 HOUR EVENTS: Called by RN stating patient went to use the bathroom and noticed uterine prolapse. Patient does not endorse any pain at this time.       ICU Vital Signs Last 24 Hrs  T(C): 36.6 (21 Mar 2025 16:50), Max: 36.8 (20 Mar 2025 23:42)  T(F): 97.8 (21 Mar 2025 16:50), Max: 98.2 (20 Mar 2025 23:42)  HR: 85 (21 Mar 2025 16:50) (85 - 108)  BP: 131/69 (21 Mar 2025 16:50) (128/78 - 154/64)  RR: 18 (21 Mar 2025 16:50) (18 - 18)  SpO2: 94% (21 Mar 2025 16:50) (94% - 98%)    O2 Parameters below as of 21 Mar 2025 16:50  Patient On (Oxygen Delivery Method): room air        ROS:   Negative for 12 body systems unless otherwise specified in HPI    Physical Exam:  General: Patient in no acute distress  : + Uterine prolapse  Neuro: A&O - at baseline        Imaging Personally Reviewed:  [ ] YES  [ ] NO  Consultant(s) Notes Reviewed:  [ ] YES  [ ] NO    Assessment/Plan:   - Attempted to push in the prolapse, unsuccessful  - GYN consulted, will see patient      Case Discussed with Dr. Melgar     40 mins assessing presenting problems of acute illness. Medical decision making including Initiating plan of care, reviewing data, reviewing radiology, discussing with multidisciplinary team, non inclusive of procedures, discussing goals of care with patient/family

## 2025-03-21 NOTE — PROGRESS NOTE ADULT - ASSESSMENT
Chloe Blackmon is an 84 year old female with PMHx of HTN, paranoid schizophrenia, and dementia who presented to the ED on 3/8/25 for complaints of inability to care for herself and admitted for acute metabolic encephalopathy secondary to UTI.    ##Acute metabolic encephalopathy secondary to UTI  Son reports patient with declining mentation since he last saw her two weeks ago  Baseline mentation is AAO x 2 to person and place, now only AAO x 1 to person only  U/A with proteinuria, ketonuria, large bilirubin, small leuks, positive nitrites, WBC 10, RBC 30, moderate bacteria, squamous epithelial cells present, glucosuria, COVID/influenza/RSV negative  CT head without acute intracranial findings, CXR without infectious etiology but with moderate-sized hiatal hernia. S/p ceftriaxone 1 g IV in the ED. B12 elevated. UCx normal filippo. TSH WNL RPR negative. S/p Abx course. MR Brain WNL. Per psychiatry, does not have schizophrenia.   - Risperdal PRN  - Reorient PRN, avoid sedating meds    #L Breast Mass  High suspicion for malignancy.  Wound care with [facility].  Options including biopsy with further possible treatment including ? chemo / RT / surgery d/w son.  Given pt's periodic agitation and known h/o paranoia, pt would be a poor candidate to tolerate further evaluation and treatment.  Previous hospitalist discussed options with Son who is in agreement, wishes to defer any further workup and treatment.  States he has discussed extensively with family. I personally discussed with patient who confirmed deferring workup for now.     #PATRICIO  BUN 25 and Cr 1.32 -> 1.11.   S/p  cc bolus in the ED  Avoid nephrotoxins, renally dose meds  Encourage PO hydration  Monitor renal function    #Social admit  Son reports patient with bouts of paranoid schizophrenia where she will chain up her apartment door so no one can come in. Son did not notice movement on cameras in patient's apartment for a week, used  to cut chains upon arrival to her apartment. Found in bed soiled in urine and feces and apartment is unkempt  Previously declining coming to live with son and daughter-in-law in [place]. Son requesting placement in facility for safety as patient is unable to care for herself. Case management and  consulted for assistance in disposition    #Hiatal hernia, incidental finding  - Will need outpatient f/u, family aware     #HTN, uncontrolled secondary to medication noncompliance  BP as elevated as 198/83, will hold off on initiation of antihypertensives for now given patient refusal to take PO meds  - C/w amlodipine 5/d    #Dementia: not on any PTA meds, turn and reposition q2    ##Uterine prolapse  Noted by nursing. Gyn evaluated. Patient is urinating appropriately so no indication for estrada at this time. Will need outpatient followup for pessary placement.     Diet: ETC  DVT prophylaxis: lovenox    Dispo: pending placement   Code Status: DNR DNI    Discussed care with Son on 03/21    I have spent a total of 42 minutes to prepare to see the patient, obtaining and reviewing history, physical examination, explaining the diagnosis, prognosis and treatment plan with the patient/family/caregiver. I also have spent the time ordering studies and testing, interpreting results, medicine reconciliation, IDRs, subspecialty consultation and documentation as above.

## 2025-03-22 PROCEDURE — 99498 ADVNCD CARE PLAN ADDL 30 MIN: CPT

## 2025-03-22 PROCEDURE — 99232 SBSQ HOSP IP/OBS MODERATE 35: CPT

## 2025-03-22 PROCEDURE — 99497 ADVNCD CARE PLAN 30 MIN: CPT

## 2025-03-22 RX ADMIN — Medication 0.25 MILLIGRAM(S): at 18:16

## 2025-03-22 RX ADMIN — Medication 1 APPLICATION(S): at 13:04

## 2025-03-22 RX ADMIN — ENOXAPARIN SODIUM 40 MILLIGRAM(S): 100 INJECTION SUBCUTANEOUS at 22:09

## 2025-03-22 RX ADMIN — AMLODIPINE BESYLATE 5 MILLIGRAM(S): 10 TABLET ORAL at 06:12

## 2025-03-22 RX ADMIN — Medication 0.25 MILLIGRAM(S): at 06:12

## 2025-03-22 NOTE — CONSULT NOTE ADULT - ASSESSMENT
A/P Pt admitted to hospital for encephalopathy found to have incidental uterine prolapse.  Pt in stable condition.  Although uterus was replaced, as soon as pt went to bathroom, the uterus came back out again.  It is not necessary to keep on replacing uterus as pt is asymptomatic.  This issue is not new and probably happened a few months ago.    Rec:    1) Apply bacitracin to uterus BID to prevent infection  2) Pt needs to be seen by Urogynecology or Gyn for pessary fitting (no pessary available at [facility]) - this is a 15 min outpt visit  3) Uterine prolapse can lead to urinary retention at times however, pt does not appear to have any issues with urinating as per nursing but if obstruction develops then pt will need estrada until she gets a pessary.  4) Would contact Dr. Whatley (Gyn), who covers [facility], on Monday regarding pessary however I am unsure if her office has them.    5) Pt stable from Gyn viewpoint and no acute gyn intervention necessary at this time.  6) Please reconsult as necessary  7) Above discussed with pt's son, Carlos Blackmon, over the phone who verbalized understanding.    ELOY Finch

## 2025-03-22 NOTE — GOALS OF CARE CONVERSATION - ADVANCED CARE PLANNING - TREATMENT GUIDELINE COMMENT
Non-inasive/extensive management can continue including IV Antibiotics, IV Fluids, and escalation to ICU. Family would not like any feeding tubes at this time.

## 2025-03-22 NOTE — CONSULT NOTE ADULT - SUBJECTIVE AND OBJECTIVE BOX
85 y/o post-menopausal woman admitted due to metabolic encephalopathy.  While in hospital pt found to have uterine prolapse.  Pt is Alert x 1 - 2  Unable to obtain history from patient.  Called son, Carlos Blackmon, and he was unaware that pt had uterine prolapse.    Chart reviewed   Medical History as per chart    Phone consent obtained from Mr. Blackmon to perform pelvic exam and replace uterus.   Witnessed by two floor nurses.    VSS, afebrile    PE -   Pelvic -   External genetalia - atrophic  +uterine prolapse (grade II) but not a complete prolapse; no signs of infection or ulceration noted  Vaginal mucosa palpted to be WNL  Unable to palpate ovaries    Uterus replaced without issue

## 2025-03-22 NOTE — GOALS OF CARE CONVERSATION - ADVANCED CARE PLANNING - CONVERSATION DETAILS
Sandra Blackmon and I discussed patients plan of care at length while inpatient. Family noticed an improvement in patients status including wound management. MOLST was discussed with family at bedside. Family in agreement to make patient DNR/DNI. Non-inasive/extensive management can continue including IV Antibiotics, IV Fluids, and escalation to ICU. Family would not like any feeding tubes at this time.    MOLST signed and witnessed by two RNs and placed in patients chart Sandra Blackmon and I discussed patients plan of care at length while inpatient. Family noticed an improvement in patients status including wound management. MOLST was discussed with family at bedside. Family in agreement to make patient DNR/DNI.    MOLST signed and witnessed by two RNs and placed in patients chart

## 2025-03-22 NOTE — CONSULT NOTE ADULT - REASON FOR ADMISSION
Acute metabolic encephalopathy secondary to UTI

## 2025-03-22 NOTE — PROGRESS NOTE ADULT - ASSESSMENT
Chloe Blackmon is an 84 year old female with PMHx of HTN, paranoid schizophrenia, and dementia who presented to the ED on 3/8/25 for complaints of inability to care for herself and admitted for acute metabolic encephalopathy secondary to UTI.    ##Acute metabolic encephalopathy secondary to UTI  Son reports patient with declining mentation since he last saw her two weeks ago  Baseline mentation is AAO x 2 to person and place, now only AAO x 1 to person only  U/A with proteinuria, ketonuria, large bilirubin, small leuks, positive nitrites, WBC 10, RBC 30, moderate bacteria, squamous epithelial cells present, glucosuria, COVID/influenza/RSV negative  CT head without acute intracranial findings, CXR without infectious etiology but with moderate-sized hiatal hernia. S/p ceftriaxone 1 g IV in the ED. B12 elevated. UCx normal filippo. TSH WNL RPR negative. S/p Abx course. MR Brain WNL. Per psychiatry, does not have schizophrenia.   - Risperdal PRN  - Reorient PRN, avoid sedating meds    #L Breast Mass  High suspicion for malignancy.  Wound care with Xeroform.  Options including biopsy with further possible treatment including ? chemo / RT / surgery d/w son.  Given pt's periodic agitation and known h/o paranoia, pt would be a poor candidate to tolerate further evaluation and treatment.  Previous hospitalist discussed options with Son who is in agreement, wishes to defer any further workup and treatment.  States he has discussed extensively with family. I personally discussed with patient who confirmed deferring workup for now.     #PATRICIO  BUN 25 and Cr 1.32 -> 1.11.   S/p  cc bolus in the ED  Avoid nephrotoxins, renally dose meds  Encourage PO hydration  Monitor renal function    #Social admit  Son reports patient with bouts of paranoid schizophrenia where she will chain up her apartment door so no one can come in. Son did not notice movement on cameras in patient's apartment for a week, used  to cut chains upon arrival to her apartment. Found in bed soiled in urine and feces and apartment is unkempt  Previously declining coming to live with son and daughter-in-law in [place]. Son requesting placement in facility for safety as patient is unable to care for herself. Case management and  consulted for assistance in disposition    #Hiatal hernia, incidental finding  - Will need outpatient f/u, family aware     #HTN, uncontrolled secondary to medication noncompliance  BP as elevated as 198/83, will hold off on initiation of antihypertensives for now given patient refusal to take PO meds  - C/w amlodipine 5/d    #Dementia: not on any PTA meds, turn and reposition q2    ##Uterine prolapse  Noted by nursing. Gyn evaluated. Patient is urinating appropriately so no indication for estrada at this time. Will need outpatient followup for pessary placement.     Diet: ETC  DVT prophylaxis: lovenox    Dispo: pending placement   Code Status: DNR DNI    Discussed care with Son on 03/21    I have spent a total of 42 minutes to prepare to see the patient, obtaining and reviewing history, physical examination, explaining the diagnosis, prognosis and treatment plan with the patient/family/caregiver. I also have spent the time ordering studies and testing, interpreting results, medicine reconciliation, IDRs, subspecialty consultation and documentation as above.

## 2025-03-23 PROCEDURE — 99232 SBSQ HOSP IP/OBS MODERATE 35: CPT

## 2025-03-23 RX ADMIN — Medication 1 APPLICATION(S): at 14:36

## 2025-03-23 RX ADMIN — Medication 0.25 MILLIGRAM(S): at 17:31

## 2025-03-23 RX ADMIN — Medication 0.25 MILLIGRAM(S): at 06:24

## 2025-03-23 RX ADMIN — ENOXAPARIN SODIUM 40 MILLIGRAM(S): 100 INJECTION SUBCUTANEOUS at 21:44

## 2025-03-23 RX ADMIN — AMLODIPINE BESYLATE 5 MILLIGRAM(S): 10 TABLET ORAL at 06:24

## 2025-03-23 NOTE — PROGRESS NOTE ADULT - ASSESSMENT
Chloe Blackmon is an 84 year old female with PMHx of HTN, paranoid schizophrenia, and dementia who presented to the ED on 3/8/25 for complaints of inability to care for herself and admitted for acute metabolic encephalopathy secondary to UTI.    ##Acute metabolic encephalopathy secondary to UTI  Son reports patient with declining mentation since he last saw her two weeks ago  Baseline mentation is AAO x 2 to person and place, now only AAO x 1 to person only  U/A with proteinuria, ketonuria, large bilirubin, small leuks, positive nitrites, WBC 10, RBC 30, moderate bacteria, squamous epithelial cells present, glucosuria, COVID/influenza/RSV negative  CT head without acute intracranial findings, CXR without infectious etiology but with moderate-sized hiatal hernia. S/p ceftriaxone 1 g IV in the ED. B12 elevated. UCx normal filippo. TSH WNL RPR negative. S/p Abx course. MR Brain WNL. Per psychiatry, does not have schizophrenia.   - Risperdal PRN  - Reorient PRN, avoid sedating meds    #L Breast Mass  High suspicion for malignancy.  Wound care with Xeroform.  Options including biopsy with further possible treatment including ? chemo / RT / surgery d/w son.  Given pt's periodic agitation and known h/o paranoia, pt would be a poor candidate to tolerate further evaluation and treatment.  Previous hospitalist discussed options with Son who is in agreement, wishes to defer any further workup and treatment.  States he has discussed extensively with family. I personally discussed with patient who confirmed deferring workup for now.     #PATRICIO  BUN 25 and Cr 1.32 -> 1.11.   S/p  cc bolus in the ED  Avoid nephrotoxins, renally dose meds  Encourage PO hydration  Monitor renal function    #Social admit  Son reports patient with bouts of paranoid schizophrenia where she will chain up her apartment door so no one can come in. Son did not notice movement on cameras in patient's apartment for a week, used  to cut chains upon arrival to her apartment. Found in bed soiled in urine and feces and apartment is unkempt  Previously declining coming to live with son and daughter-in-law in [place]. Son requesting placement in facility for safety as patient is unable to care for herself. Case management and  consulted for assistance in disposition    #Hiatal hernia, incidental finding  - Will need outpatient f/u, family aware     #HTN, uncontrolled secondary to medication noncompliance  BP as elevated as 198/83, will hold off on initiation of antihypertensives for now given patient refusal to take PO meds  - C/w amlodipine 5/d    #Dementia: not on any PTA meds, turn and reposition q2    ##Uterine prolapse  Noted by nursing. Gyn evaluated. Patient is urinating appropriately so no indication for estrada at this time. Will need outpatient followup for pessary placement. Discussed with Son.     Diet: ETC  DVT prophylaxis: lovenox    Dispo: pending placement   Code Status: DNR DNI; MOLST updated     Discussed care with son, Daughter in law on 03/22    I have spent a total of 40 minutes to prepare to see the patient, obtaining and reviewing history, physical examination, explaining the diagnosis, prognosis and treatment plan with the patient/family/caregiver. I also have spent the time ordering studies and testing, interpreting results, medicine reconciliation, IDRs, subspecialty consultation and documentation as above.

## 2025-03-24 PROCEDURE — 99232 SBSQ HOSP IP/OBS MODERATE 35: CPT

## 2025-03-24 RX ADMIN — Medication 1 APPLICATION(S): at 16:53

## 2025-03-24 RX ADMIN — Medication 0.25 MILLIGRAM(S): at 16:53

## 2025-03-24 RX ADMIN — Medication 3 MILLIGRAM(S): at 00:34

## 2025-03-24 RX ADMIN — Medication 0.25 MILLIGRAM(S): at 06:33

## 2025-03-24 RX ADMIN — ENOXAPARIN SODIUM 40 MILLIGRAM(S): 100 INJECTION SUBCUTANEOUS at 22:27

## 2025-03-24 RX ADMIN — AMLODIPINE BESYLATE 5 MILLIGRAM(S): 10 TABLET ORAL at 06:32

## 2025-03-24 NOTE — PROGRESS NOTE ADULT - PROVIDER SPECIALTY LIST ADULT
Hospitalist
Internal Medicine
Hospitalist
Internal Medicine
Hospitalist

## 2025-03-24 NOTE — PROGRESS NOTE ADULT - ASSESSMENT
Chloe Blackmon is an 84 year old female with PMHx of HTN, paranoid schizophrenia, and dementia who presented to the ED on 3/8/25 for complaints of inability to care for herself and admitted for acute metabolic encephalopathy secondary to UTI.    ##Acute metabolic encephalopathy secondary to UTI  Son reports patient with declining mentation since he last saw her two weeks ago  Baseline mentation is AAO x 2 to person and place, now only AAO x 1 to person only  U/A with proteinuria, ketonuria, large bilirubin, small leuks, positive nitrites, WBC 10, RBC 30, moderate bacteria, squamous epithelial cells present, glucosuria, COVID/influenza/RSV negative  CT head without acute intracranial findings, CXR without infectious etiology but with moderate-sized hiatal hernia. S/p ceftriaxone 1 g IV in the ED. B12 elevated. UCx normal filippo. TSH WNL RPR negative. S/p Abx course. MR Brain WNL. Per psychiatry, does not have schizophrenia.   - Risperdal PRN  - Reorient PRN, avoid sedating meds    #L Breast Mass  High suspicion for malignancy.  Wound care with Xeroform.  Options including biopsy with further possible treatment including ? chemo / RT / surgery d/w son.  Given pt's periodic agitation and known h/o paranoia, pt would be a poor candidate to tolerate further evaluation and treatment.  Previous hospitalist discussed options with Son who is in agreement, wishes to defer any further workup and treatment.  States he has discussed extensively with family. I personally discussed with patient who confirmed deferring workup for now.     #PATRICIO  BUN 25 and Cr 1.32 -> 1.11.   S/p  cc bolus in the ED  Avoid nephrotoxins, renally dose meds  Encourage PO hydration  Monitor renal function    #Social admit  Son reports patient with bouts of paranoid schizophrenia where she will chain up her apartment door so no one can come in. Son did not notice movement on cameras in patient's apartment for a week, used  to cut chains upon arrival to her apartment. Found in bed soiled in urine and feces and apartment is unkempt  Previously declining coming to live with son and daughter-in-law in [place]. Son requesting placement in facility for safety as patient is unable to care for herself. Case management and  consulted for assistance in disposition    #Hiatal hernia, incidental finding  - Will need outpatient f/u, family aware     #HTN, uncontrolled secondary to medication noncompliance  BP as elevated as 198/83, will hold off on initiation of antihypertensives for now given patient refusal to take PO meds  - C/w amlodipine 5/d    #Dementia: not on any PTA meds, turn and reposition q2    ##Uterine prolapse  Noted by nursing. Gyn evaluated. Patient is urinating appropriately so no indication for estrada at this time. Will need outpatient followup for pessary placement. Discussed with Son.     Diet: ETC  DVT prophylaxis: lovenox    Dispo: pending placement   Code Status: DNR DNI; MOLST updated     Discussed care with son, Daughter in law on 03/22    I have spent a total of 35 minutes to prepare to see the patient, obtaining and reviewing history, physical examination, explaining the diagnosis, prognosis and treatment plan with the patient/family/caregiver. I also have spent the time ordering studies and testing, interpreting results, medicine reconciliation, IDRs, subspecialty consultation and documentation as above.

## 2025-03-24 NOTE — PROGRESS NOTE ADULT - SUBJECTIVE AND OBJECTIVE BOX
Patient: URSULA AVILA [id number] 84y Female                            Hospitalist Attending Note    No complaints.  No pain  More somnolent this am.  Became hypotensive, seen as part of RRT.  Responded well to IVF.  No complaints.  Son at bedside Carlos.    ____________________PHYSICAL EXAM:  GENERAL:  NAD, awake, confused.  Oriented to person.  HEENT: NCAT  CARDIOVASCULAR:  S1, S2  LUNGS: CTAB  ABDOMEN:  soft, (-) tenderness, (-) distension, (+) bowel sounds, (-) guarding, (-) rebound (-) rigidity  EXTREMITIES:  no cyanosis / clubbing / edema.   SKIN: L inferior breast open wound with clean base  ____________________      VITALS:  Vital Signs Last 24 Hrs  T(C): 36.8 (14 Mar 2025 13:45), Max: 37.2 (13 Mar 2025 23:29)  T(F): 98.2 (14 Mar 2025 13:45), Max: 98.9 (13 Mar 2025 23:29)  HR: 86 (14 Mar 2025 13:45) (67 - 96)  BP: 128/69 (14 Mar 2025 13:53) (68/46 - 129/72)  BP(mean): --  RR: 18 (14 Mar 2025 13:45) (17 - 18)  SpO2: 98% (14 Mar 2025 13:45) (96% - 98%)    Parameters below as of 14 Mar 2025 13:45  Patient On (Oxygen Delivery Method): room air     Daily     Daily   CAPILLARY BLOOD GLUCOSE      POCT Blood Glucose.: 135 mg/dL (14 Mar 2025 13:46)    I&O's Summary      LABS:                        11.7   2.46  )-----------( 146      ( 14 Mar 2025 10:35 )             35.4     03-14    141  |  104  |  13  ----------------------------<  133[H]  2.9[LL]   |  33[H]  |  1.04    Ca    9.1      14 Mar 2025 10:35  Phos  3.3     03-14  Mg     1.9     03-14          Urinalysis Basic - ( 14 Mar 2025 10:35 )    Color: x / Appearance: x / SG: x / pH: x  Gluc: 133 mg/dL / Ketone: x  / Bili: x / Urobili: x   Blood: x / Protein: x / Nitrite: x   Leuk Esterase: x / RBC: x / WBC x   Sq Epi: x / Non Sq Epi: x / Bacteria: x              MEDICATIONS:  acetaminophen     Tablet .. 650 milliGRAM(s) Oral every 6 hours PRN  aluminum hydroxide/magnesium hydroxide/simethicone Suspension 30 milliLiter(s) Oral every 4 hours PRN  cefTRIAXone   IVPB      enoxaparin Injectable 40 milliGRAM(s) SubCutaneous every 24 hours  lactated ringers. 1000 milliLiter(s) IV Continuous <Continuous>  melatonin 3 milliGRAM(s) Oral at bedtime PRN  ondansetron Injectable 4 milliGRAM(s) IV Push every 8 hours PRN  potassium chloride   Powder 40 milliEquivalent(s) Oral every 4 hours  risperiDONE   Tablet 0.25 milliGRAM(s) Oral two times a day    
                          Patient: URSULA AVILA [id number] 84y Female                            Hospitalist Attending Note    No complaints.  No pain  Agitated at times.  Offers no specific complaints.    ____________________PHYSICAL EXAM:  GENERAL:  NAD, awake, confused.  Oriented to person.  HEENT: NCAT  CARDIOVASCULAR:  S1, S2  LUNGS: CTAB  ABDOMEN:  soft, (-) tenderness, (-) distension, (+) bowel sounds, (-) guarding, (-) rebound (-) rigidity  EXTREMITIES:  no cyanosis / clubbing / edema.   SKIN: L inferior breast open wound with clean base  ____________________      VITALS:  Vital Signs Last 24 Hrs  T(C): 37 (15 Mar 2025 17:26), Max: 37 (15 Mar 2025 17:26)  T(F): 98.6 (15 Mar 2025 17:26), Max: 98.6 (15 Mar 2025 17:26)  HR: 120 (15 Mar 2025 17:26) (62 - 120)  BP: 183/85 (15 Mar 2025 17:26) (138/74 - 183/85)  BP(mean): --  RR: 18 (15 Mar 2025 17:26) (17 - 18)  SpO2: 98% (15 Mar 2025 17:26) (97% - 100%)    Parameters below as of 15 Mar 2025 17:26  Patient On (Oxygen Delivery Method): room air     Daily     Daily   CAPILLARY BLOOD GLUCOSE      POCT Blood Glucose.: 88 mg/dL (15 Mar 2025 07:50)    I&O's Summary    14 Mar 2025 07:01  -  15 Mar 2025 07:00  --------------------------------------------------------  IN: 1425 mL / OUT: 0 mL / NET: 1425 mL        LABS:                        10.8   2.34  )-----------( 124      ( 15 Mar 2025 07:30 )             32.5     03-14    141  |  104  |  13  ----------------------------<  133[H]  2.9[LL]   |  33[H]  |  1.04    Ca    9.1      14 Mar 2025 10:35  Phos  3.3     03-14  Mg     1.9     03-14          Urinalysis Basic - ( 14 Mar 2025 10:35 )    Color: x / Appearance: x / SG: x / pH: x  Gluc: 133 mg/dL / Ketone: x  / Bili: x / Urobili: x   Blood: x / Protein: x / Nitrite: x   Leuk Esterase: x / RBC: x / WBC x   Sq Epi: x / Non Sq Epi: x / Bacteria: x              MEDICATIONS:  acetaminophen     Tablet .. 650 milliGRAM(s) Oral every 6 hours PRN  aluminum hydroxide/magnesium hydroxide/simethicone Suspension 30 milliLiter(s) Oral every 4 hours PRN  cefTRIAXone   IVPB      cefTRIAXone   IVPB 1000 milliGRAM(s) IV Intermittent every 24 hours  collagenase Ointment 1 Application(s) Topical daily  enoxaparin Injectable 40 milliGRAM(s) SubCutaneous every 24 hours  lactated ringers. 1000 milliLiter(s) IV Continuous <Continuous>  melatonin 3 milliGRAM(s) Oral at bedtime PRN  ondansetron Injectable 4 milliGRAM(s) IV Push every 8 hours PRN  risperiDONE   Tablet 0.25 milliGRAM(s) Oral two times a day    
                          Patient: URSULA AVILA [id number] 84y Female                            Hospitalist Attending Note    No complaints.  No pain    ____________________PHYSICAL EXAM:  GENERAL:  NAD, awake, confused.  Oriented to person.  HEENT: NCAT  CARDIOVASCULAR:  S1, S2  LUNGS: CTAB  ABDOMEN:  soft, (-) tenderness, (-) distension, (+) bowel sounds, (-) guarding, (-) rebound (-) rigidity  EXTREMITIES:  no cyanosis / clubbing / edema.   SKIN: L inferior breast open wound with clean base  ____________________      VITALS:  Vital Signs Last 24 Hrs  T(C): 36.6 (16 Mar 2025 11:24), Max: 37 (15 Mar 2025 17:26)  T(F): 97.8 (16 Mar 2025 11:24), Max: 98.6 (15 Mar 2025 17:26)  HR: 110 (16 Mar 2025 11:24) (97 - 120)  BP: 142/85 (16 Mar 2025 11:24) (142/85 - 183/85)  BP(mean): --  RR: 18 (16 Mar 2025 11:24) (18 - 18)  SpO2: 97% (16 Mar 2025 11:24) (97% - 99%)    Parameters below as of 16 Mar 2025 11:24  Patient On (Oxygen Delivery Method): room air     Daily     Daily   CAPILLARY BLOOD GLUCOSE        I&O's Summary      LABS:                        10.8   2.34  )-----------( 124      ( 15 Mar 2025 07:30 )             32.5     03-16    137  |  102  |  6[L]  ----------------------------<  107[H]  3.5   |  30  |  0.88    Ca    10.1      16 Mar 2025 07:20          Urinalysis Basic - ( 16 Mar 2025 07:20 )    Color: x / Appearance: x / SG: x / pH: x  Gluc: 107 mg/dL / Ketone: x  / Bili: x / Urobili: x   Blood: x / Protein: x / Nitrite: x   Leuk Esterase: x / RBC: x / WBC x   Sq Epi: x / Non Sq Epi: x / Bacteria: x              MEDICATIONS:  acetaminophen     Tablet .. 650 milliGRAM(s) Oral every 6 hours PRN  aluminum hydroxide/magnesium hydroxide/simethicone Suspension 30 milliLiter(s) Oral every 4 hours PRN  cefTRIAXone   IVPB      cefTRIAXone   IVPB 1000 milliGRAM(s) IV Intermittent every 24 hours  collagenase Ointment 1 Application(s) Topical daily  enoxaparin Injectable 40 milliGRAM(s) SubCutaneous every 24 hours  lactated ringers. 1000 milliLiter(s) IV Continuous <Continuous>  melatonin 3 milliGRAM(s) Oral at bedtime PRN  ondansetron Injectable 4 milliGRAM(s) IV Push every 8 hours PRN  risperiDONE   Tablet 0.25 milliGRAM(s) Oral two times a day    
Hospitalist Daily Progress Note     *SUBJECTIVE*    Interval Events:  NAEON, Resting comfortably. HD Stable.      *OBJECTIVE*    PHYSICAL EXAM:  GENERAL:  NAD, awake, confused.  Oriented to person.  HEENT: NCAT  CARDIOVASCULAR:  S1, S2  LUNGS: CTAB  ABDOMEN:  soft, (-) tenderness, (-) distension, (+) bowel sounds, (-) guarding, (-) rebound (-) rigidity    OBJECTIVE DATA:   Vital Signs Last 24 Hrs  T(C): 36.8 (23 Mar 2025 10:37), Max: 36.9 (22 Mar 2025 23:38)  T(F): 98.2 (23 Mar 2025 10:37), Max: 98.4 (22 Mar 2025 23:38)  HR: 84 (23 Mar 2025 10:37) (74 - 87)  BP: 111/67 (23 Mar 2025 10:37) (111/67 - 133/60)  BP(mean): --  RR: 18 (23 Mar 2025 10:37) (17 - 18)  SpO2: 96% (23 Mar 2025 10:37) (95% - 98%)    Parameters below as of 23 Mar 2025 10:37  Patient On (Oxygen Delivery Method): room air               Daily     Daily     Labs, Interval Radiology studies, Medications reviewed by me        
Hospitalist Daily Progress Note     *SUBJECTIVE*    Interval Events:  NAEON, Resting comfortably. HD Stable.      *OBJECTIVE*    PHYSICAL EXAM:  GENERAL:  NAD, awake, confused.  Oriented to person.  HEENT: NCAT  CARDIOVASCULAR:  S1, S2  LUNGS: CTAB  ABDOMEN:  soft, (-) tenderness, (-) distension, (+) bowel sounds, (-) guarding, (-) rebound (-) rigidity  EXTREMITIES:  no cyanosis / clubbing / edema.   SKIN: L inferior breast open wound with clean base    OBJECTIVE DATA:   Vital Signs Last 24 Hrs  T(C): 36.6 (19 Mar 2025 10:40), Max: 36.9 (18 Mar 2025 17:09)  T(F): 97.9 (19 Mar 2025 10:40), Max: 98.4 (18 Mar 2025 17:09)  HR: 80 (19 Mar 2025 10:40) (67 - 84)  BP: 97/57 (19 Mar 2025 10:40) (96/64 - 126/76)  BP(mean): --  RR: 18 (19 Mar 2025 10:40) (17 - 18)  SpO2: 96% (19 Mar 2025 10:40) (96% - 97%)    Parameters below as of 19 Mar 2025 10:40  Patient On (Oxygen Delivery Method): room air               Daily     Daily     Labs, Interval Radiology studies, Medications reviewed by me        
Hospitalist Daily Progress Note     *SUBJECTIVE*    Interval Events:  NAEON, Resting comfortably. HD Stable.      *OBJECTIVE*    PHYSICAL EXAM:  GENERAL:  NAD, awake, confused.  Oriented to person.  HEENT: NCAT  CARDIOVASCULAR:  S1, S2  LUNGS: CTAB  ABDOMEN:  soft, (-) tenderness, (-) distension, (+) bowel sounds, (-) guarding, (-) rebound (-) rigidity  EXTREMITIES:  no cyanosis / clubbing / edema.   SKIN: L inferior breast open wound with clean base  OBJECTIVE DATA:   Vital Signs Last 24 Hrs  T(C): 36.4 (22 Mar 2025 10:59), Max: 36.8 (21 Mar 2025 23:38)  T(F): 97.5 (22 Mar 2025 10:59), Max: 98.3 (21 Mar 2025 23:38)  HR: 81 (22 Mar 2025 10:59) (72 - 85)  BP: 104/68 (22 Mar 2025 10:59) (104/68 - 138/72)  BP(mean): --  RR: 17 (22 Mar 2025 10:59) (17 - 18)  SpO2: 96% (22 Mar 2025 10:59) (94% - 99%)    Parameters below as of 22 Mar 2025 10:59  Patient On (Oxygen Delivery Method): room air               Daily     Daily     Labs, Interval Radiology studies, Medications reviewed by me        
Hospitalist Daily Progress Note     *SUBJECTIVE*    Interval Events:  NAEON, Resting comfortably. HD Stable.      *OBJECTIVE*    PHYSICAL EXAM:  CONSTITUTIONAL: no apparent distress  EYES: PERRLA and symmetric, EOMI  ENMT: Oral mucosa with dry membranes, hard of hearing b/l  RESP: No respiratory distress, no use of accessory muscles; CTA b/l  CV: RRR  GI: Soft, NT, ND  NEURO: alert, responds to name, unable to perform neurological exam    OBJECTIVE DATA:   Vital Signs Last 24 Hrs  T(C): 36.4 (10 Mar 2025 05:12), Max: 36.9 (10 Mar 2025 01:33)  T(F): 97.5 (10 Mar 2025 05:12), Max: 98.5 (10 Mar 2025 01:33)  HR: 59 (10 Mar 2025 05:12) (59 - 72)  BP: 167/81 (10 Mar 2025 05:12) (159/81 - 178/82)  BP(mean): --  RR: 18 (10 Mar 2025 05:12) (18 - 19)  SpO2: 97% (10 Mar 2025 05:12) (97% - 99%)    Parameters below as of 10 Mar 2025 05:12  Patient On (Oxygen Delivery Method): room air               Daily     Daily     Labs, Interval Radiology studies, Medications reviewed by me        
Hospitalist Daily Progress Note     *SUBJECTIVE*    Interval Events:  NAEON, Resting comfortably. HD Stable.      *OBJECTIVE*    PHYSICAL EXAM:  GENERAL:  NAD, awake, confused.  Oriented to person.  HEENT: NCAT  CARDIOVASCULAR:  S1, S2  LUNGS: CTAB  ABDOMEN:  soft, (-) tenderness, (-) distension, (+) bowel sounds, (-) guarding, (-) rebound (-) rigidity    OBJECTIVE DATA:   Vital Signs Last 24 Hrs  T(C): 36.4 (22 Mar 2025 10:59), Max: 36.8 (21 Mar 2025 23:38)  T(F): 97.5 (22 Mar 2025 10:59), Max: 98.3 (21 Mar 2025 23:38)  HR: 81 (22 Mar 2025 10:59) (72 - 85)  BP: 104/68 (22 Mar 2025 10:59) (104/68 - 138/72)  BP(mean): --  RR: 17 (22 Mar 2025 10:59) (17 - 18)  SpO2: 96% (22 Mar 2025 10:59) (94% - 99%)    Parameters below as of 22 Mar 2025 10:59  Patient On (Oxygen Delivery Method): room air               Daily     Daily     Labs, Interval Radiology studies, Medications reviewed by me        
Hospitalist Daily Progress Note     *SUBJECTIVE*    Interval Events:  NAEON, Resting comfortably. HD Stable.      *OBJECTIVE*    PHYSICAL EXAM:  GENERAL:  NAD, awake, confused.  Oriented to person.  HEENT: NCAT  CARDIOVASCULAR:  S1, S2  LUNGS: CTAB  ABDOMEN:  soft, (-) tenderness, (-) distension, (+) bowel sounds, (-) guarding, (-) rebound (-) rigidity    OBJECTIVE DATA:   Vital Signs Last 24 Hrs  T(C): 36.7 (24 Mar 2025 11:00), Max: 37.4 (23 Mar 2025 17:31)  T(F): 98 (24 Mar 2025 11:00), Max: 99.4 (23 Mar 2025 17:31)  HR: 98 (24 Mar 2025 11:00) (76 - 98)  BP: 134/71 (24 Mar 2025 11:00) (134/71 - 160/76)  BP(mean): --  RR: 18 (24 Mar 2025 11:00) (18 - 18)  SpO2: 97% (24 Mar 2025 11:00) (96% - 97%)    Parameters below as of 24 Mar 2025 11:00  Patient On (Oxygen Delivery Method): room air               Daily     Daily     Labs, Interval Radiology studies, Medications reviewed by me        
                          Patient: URSULA AVILA [id number] 84y Female                            Hospitalist Attending Note    No complaints.  No pain  RN states pt pulled out IV yesterday afternoon and refused replacement.  Overnight, developed agitation requiring Haldol, risperidal.      ____________________PHYSICAL EXAM:  GENERAL:  NAD, awake, confused.  Oriented to person.  HEENT: NCAT  CARDIOVASCULAR:  S1, S2  LUNGS: CTAB  ABDOMEN:  soft, (-) tenderness, (-) distension, (+) bowel sounds, (-) guarding, (-) rebound (-) rigidity  EXTREMITIES:  no cyanosis / clubbing / edema.   SKIN: L inferior breast open wound with clean base  ____________________    VITALS:  Vital Signs Last 24 Hrs  T(C): 36.3 (13 Mar 2025 10:53), Max: 36.7 (12 Mar 2025 23:27)  T(F): 97.3 (13 Mar 2025 10:53), Max: 98.1 (12 Mar 2025 23:27)  HR: 63 (13 Mar 2025 10:53) (63 - 115)  BP: 149/76 (13 Mar 2025 10:53) (111/48 - 149/76)  BP(mean): --  RR: 16 (13 Mar 2025 10:53) (16 - 18)  SpO2: 99% (13 Mar 2025 10:53) (97% - 99%)    Parameters below as of 12 Mar 2025 17:01  Patient On (Oxygen Delivery Method): room air     Daily     Daily   CAPILLARY BLOOD GLUCOSE        I&O's Summary      LABS:                        12.5   2.36  )-----------( 123      ( 13 Mar 2025 06:53 )             36.4     03-12    135  |  100  |  9   ----------------------------<  89  3.5   |  31  |  0.85    Ca    9.3      12 Mar 2025 06:00  Mg     1.5     03-12          Urinalysis Basic - ( 12 Mar 2025 06:00 )    Color: x / Appearance: x / SG: x / pH: x  Gluc: 89 mg/dL / Ketone: x  / Bili: x / Urobili: x   Blood: x / Protein: x / Nitrite: x   Leuk Esterase: x / RBC: x / WBC x   Sq Epi: x / Non Sq Epi: x / Bacteria: x              MEDICATIONS:  acetaminophen     Tablet .. 650 milliGRAM(s) Oral every 6 hours PRN  aluminum hydroxide/magnesium hydroxide/simethicone Suspension 30 milliLiter(s) Oral every 4 hours PRN  enoxaparin Injectable 40 milliGRAM(s) SubCutaneous every 24 hours  melatonin 3 milliGRAM(s) Oral at bedtime PRN  ondansetron Injectable 4 milliGRAM(s) IV Push every 8 hours PRN  risperiDONE   Tablet 0.25 milliGRAM(s) Oral two times a day    
                          Patient: URSULA AVILA [id number] 84y Female                            Hospitalist Attending Note    No complaints.  No pain    ____________________PHYSICAL EXAM:  GENERAL:  NAD, awake, confused.  Oriented to person.  HEENT: NCAT  CARDIOVASCULAR:  S1, S2  LUNGS: CTAB  ABDOMEN:  soft, (-) tenderness, (-) distension, (+) bowel sounds, (-) guarding, (-) rebound (-) rigidity  EXTREMITIES:  no cyanosis / clubbing / edema.   SKIN: L inferior breast open wound with clean base  ____________________    VITALS:  Vital Signs Last 24 Hrs  T(C): 36.9 (18 Mar 2025 17:09), Max: 37.4 (18 Mar 2025 00:07)  T(F): 98.4 (18 Mar 2025 17:09), Max: 99.4 (18 Mar 2025 00:07)  HR: 84 (18 Mar 2025 17:09) (66 - 85)  BP: 96/64 (18 Mar 2025 17:09) (96/64 - 115/72)  BP(mean): --  RR: 18 (18 Mar 2025 17:09) (18 - 18)  SpO2: 96% (18 Mar 2025 17:09) (96% - 98%)    Parameters below as of 18 Mar 2025 17:09  Patient On (Oxygen Delivery Method): room air     Daily     Daily   CAPILLARY BLOOD GLUCOSE        I&O's Summary    17 Mar 2025 07:01  -  18 Mar 2025 07:00  --------------------------------------------------------  IN: 1380 mL / OUT: 0 mL / NET: 1380 mL        LABS:                        13.5   2.82  )-----------( 154      ( 17 Mar 2025 05:40 )             40.3     03-18    140  |  106  |  14  ----------------------------<  118[H]  3.8   |  30  |  0.86    Ca    9.3      18 Mar 2025 09:20          Urinalysis Basic - ( 18 Mar 2025 09:20 )    Color: x / Appearance: x / SG: x / pH: x  Gluc: 118 mg/dL / Ketone: x  / Bili: x / Urobili: x   Blood: x / Protein: x / Nitrite: x   Leuk Esterase: x / RBC: x / WBC x   Sq Epi: x / Non Sq Epi: x / Bacteria: x              MEDICATIONS:  acetaminophen     Tablet .. 650 milliGRAM(s) Oral every 6 hours PRN  aluminum hydroxide/magnesium hydroxide/simethicone Suspension 30 milliLiter(s) Oral every 4 hours PRN  amLODIPine   Tablet 5 milliGRAM(s) Oral daily  collagenase Ointment 1 Application(s) Topical daily  enoxaparin Injectable 40 milliGRAM(s) SubCutaneous every 24 hours  lactated ringers. 1000 milliLiter(s) IV Continuous <Continuous>  melatonin 3 milliGRAM(s) Oral at bedtime PRN  ondansetron Injectable 4 milliGRAM(s) IV Push every 8 hours PRN  risperiDONE   Tablet 0.25 milliGRAM(s) Oral two times a day    
Hospitalist Daily Progress Note     *SUBJECTIVE*    Interval Events:  NAEON, Resting comfortably. HD Stable.      *OBJECTIVE*    PHYSICAL EXAM:  CONSTITUTIONAL: no apparent distress  EYES: PERRLA and symmetric, EOMI  ENMT: Oral mucosa with dry membranes, hard of hearing b/l  RESP: No respiratory distress, no use of accessory muscles; CTA b/l  CV: RRR  GI: Soft, NT, ND  NEURO: alert, responds to name, unable to perform neurological exam    OBJECTIVE DATA:   Vital Signs Last 24 Hrs  T(C): 36.4 (09 Mar 2025 05:29), Max: 36.8 (08 Mar 2025 15:32)  T(F): 97.5 (09 Mar 2025 05:29), Max: 98.2 (08 Mar 2025 15:32)  HR: 70 (09 Mar 2025 05:29) (58 - 71)  BP: 183/84 (09 Mar 2025 05:29) (115/69 - 198/83)  BP(mean): 96 (08 Mar 2025 15:32) (96 - 96)  RR: 18 (09 Mar 2025 05:29) (16 - 20)  SpO2: 97% (09 Mar 2025 05:29) (95% - 100%)    Parameters below as of 09 Mar 2025 05:29  Patient On (Oxygen Delivery Method): room air               Daily Height in cm: 170.18 (08 Mar 2025 12:30)    Daily Weight in k.7 (08 Mar 2025 22:35)    Labs, Interval Radiology studies, Medications reviewed by me        
Hospitalist Daily Progress Note     *SUBJECTIVE*    Interval Events:  NAEON, Resting comfortably. HD Stable.      *OBJECTIVE*    PHYSICAL EXAM:  GENERAL:  NAD, awake, confused.  Oriented to person.  HEENT: NCAT  CARDIOVASCULAR:  S1, S2  LUNGS: CTAB  ABDOMEN:  soft, (-) tenderness, (-) distension, (+) bowel sounds, (-) guarding, (-) rebound (-) rigidity  EXTREMITIES:  no cyanosis / clubbing / edema.   SKIN: L inferior breast open wound with clean base    OBJECTIVE DATA:   Vital Signs Last 24 Hrs  T(C): 36.7 (21 Mar 2025 05:18), Max: 36.8 (20 Mar 2025 23:42)  T(F): 98 (21 Mar 2025 05:18), Max: 98.2 (20 Mar 2025 23:42)  HR: 108 (21 Mar 2025 05:18) (71 - 108)  BP: 154/64 (21 Mar 2025 05:18) (118/71 - 154/64)  BP(mean): --  RR: 18 (21 Mar 2025 05:18) (17 - 18)  SpO2: 97% (21 Mar 2025 05:18) (97% - 97%)    Parameters below as of 21 Mar 2025 05:18  Patient On (Oxygen Delivery Method): room air               Daily     Daily     Labs, Interval Radiology studies, Medications reviewed by me        
                          Patient: URSULA AVILA [id number] 84y Female                            Hospitalist Attending Note    No complaints.  No pain    ____________________PHYSICAL EXAM:  GENERAL:  NAD, awake, confused.  Oriented to person.  HEENT: NCAT  CARDIOVASCULAR:  S1, S2  LUNGS: CTAB  ABDOMEN:  soft, (-) tenderness, (-) distension, (+) bowel sounds, (-) guarding, (-) rebound (-) rigidity  EXTREMITIES:  no cyanosis / clubbing / edema.   SKIN: L inferior breast open wound with clean base  ____________________    VITALS:  Vital Signs Last 24 Hrs  T(C): 36.6 (17 Mar 2025 11:24), Max: 37 (17 Mar 2025 00:18)  T(F): 97.8 (17 Mar 2025 11:24), Max: 98.6 (17 Mar 2025 00:18)  HR: 61 (17 Mar 2025 11:24) (61 - 101)  BP: 128/72 (17 Mar 2025 11:24) (128/72 - 160/92)  BP(mean): --  RR: 18 (17 Mar 2025 11:24) (18 - 18)  SpO2: 97% (17 Mar 2025 11:24) (96% - 100%)    Parameters below as of 17 Mar 2025 04:38  Patient On (Oxygen Delivery Method): room air     Daily     Daily Weight in k.8 (17 Mar 2025 04:38)  CAPILLARY BLOOD GLUCOSE        I&O's Summary    16 Mar 2025 07:01  -  17 Mar 2025 07:00  --------------------------------------------------------  IN: 0 mL / OUT: 350 mL / NET: -350 mL    17 Mar 2025 07:01  -  17 Mar 2025 16:27  --------------------------------------------------------  IN: 480 mL / OUT: 0 mL / NET: 480 mL        LABS:                        13.5   2.82  )-----------( 154      ( 17 Mar 2025 05:40 )             40.3     03-16    137  |  102  |  6[L]  ----------------------------<  107[H]  3.5   |  30  |  0.88    Ca    10.1      16 Mar 2025 07:20          Urinalysis Basic - ( 16 Mar 2025 07:20 )    Color: x / Appearance: x / SG: x / pH: x  Gluc: 107 mg/dL / Ketone: x  / Bili: x / Urobili: x   Blood: x / Protein: x / Nitrite: x   Leuk Esterase: x / RBC: x / WBC x   Sq Epi: x / Non Sq Epi: x / Bacteria: x              MEDICATIONS:  acetaminophen     Tablet .. 650 milliGRAM(s) Oral every 6 hours PRN  aluminum hydroxide/magnesium hydroxide/simethicone Suspension 30 milliLiter(s) Oral every 4 hours PRN  amLODIPine   Tablet 5 milliGRAM(s) Oral daily  collagenase Ointment 1 Application(s) Topical daily  enoxaparin Injectable 40 milliGRAM(s) SubCutaneous every 24 hours  lactated ringers. 1000 milliLiter(s) IV Continuous <Continuous>  melatonin 3 milliGRAM(s) Oral at bedtime PRN  ondansetron Injectable 4 milliGRAM(s) IV Push every 8 hours PRN  risperiDONE   Tablet 0.25 milliGRAM(s) Oral two times a day    
                          Patient: URSULA AVILA [id number] 84y Female                            Hospitalist Attending Note    No complaints.  No pain    ____________________PHYSICAL EXAM:  GENERAL:  NAD, awake, confused.  Oriented to person.  HEENT: NCAT  CARDIOVASCULAR:  S1, S2  LUNGS: CTAB  ABDOMEN:  soft, (-) tenderness, (-) distension, (+) bowel sounds, (-) guarding, (-) rebound (-) rigidity  EXTREMITIES:  no cyanosis / clubbing / edema.   SKIN: L inferior breast open wound with clean base  ____________________     VITALS:  Vital Signs Last 24 Hrs  T(C): 36.4 (12 Mar 2025 17:01), Max: 36.9 (11 Mar 2025 23:59)  T(F): 97.6 (12 Mar 2025 17:01), Max: 98.4 (11 Mar 2025 23:59)  HR: 96 (12 Mar 2025 17:01) (59 - 101)  BP: 111/48 (12 Mar 2025 17:01) (111/48 - 166/85)  BP(mean): --  RR: 16 (12 Mar 2025 17:01) (16 - 18)  SpO2: 98% (12 Mar 2025 17:01) (94% - 98%)    Parameters below as of 12 Mar 2025 17:01  Patient On (Oxygen Delivery Method): room air     Daily     Daily   CAPILLARY BLOOD GLUCOSE        I&O's Summary    11 Mar 2025 07:01  -  12 Mar 2025 07:00  --------------------------------------------------------  IN: 1200 mL / OUT: 0 mL / NET: 1200 mL        HISTORY:  PAST MEDICAL & SURGICAL HISTORY:  HTN (hypertension)      Dementia      Allergies    No Known Allergies    Intolerances       LABS:                        13.2   2.10  )-----------( 131      ( 12 Mar 2025 06:00 )             39.1     03-12    135  |  100  |  9   ----------------------------<  89  3.5   |  31  |  0.85    Ca    9.3      12 Mar 2025 06:00  Mg     1.5     03-12          Urinalysis Basic - ( 12 Mar 2025 06:00 )    Color: x / Appearance: x / SG: x / pH: x  Gluc: 89 mg/dL / Ketone: x  / Bili: x / Urobili: x   Blood: x / Protein: x / Nitrite: x   Leuk Esterase: x / RBC: x / WBC x   Sq Epi: x / Non Sq Epi: x / Bacteria: x              MEDICATIONS:  MEDICATIONS  (STANDING):  lactated ringers. 1000 milliLiter(s) (100 mL/Hr) IV Continuous <Continuous>  risperiDONE   Tablet 0.25 milliGRAM(s) Oral two times a day    MEDICATIONS  (PRN):  acetaminophen     Tablet .. 650 milliGRAM(s) Oral every 6 hours PRN Temp greater or equal to 38C (100.4F), Mild Pain (1 - 3)  aluminum hydroxide/magnesium hydroxide/simethicone Suspension 30 milliLiter(s) Oral every 4 hours PRN Dyspepsia  melatonin 3 milliGRAM(s) Oral at bedtime PRN Insomnia  ondansetron Injectable 4 milliGRAM(s) IV Push every 8 hours PRN Nausea and/or Vomiting  
Hospitalist Daily Progress Note     *SUBJECTIVE*    Interval Events:  NAEON, Resting comfortably. HD Stable.      *OBJECTIVE*    PHYSICAL EXAM:  CONSTITUTIONAL: no apparent distress  EYES: PERRLA and symmetric, EOMI  ENMT: Oral mucosa with dry membranes, hard of hearing b/l  RESP: No respiratory distress, no use of accessory muscles; CTA b/l  CV: RRR  GI: Soft, NT, ND  NEURO: alert, responds to name, unable to perform neurological exam    OBJECTIVE DATA:   Vital Signs Last 24 Hrs  T(C): 36.7 (11 Mar 2025 11:00), Max: 36.8 (10 Mar 2025 23:41)  T(F): 98 (11 Mar 2025 11:00), Max: 98.2 (10 Mar 2025 23:41)  HR: 76 (11 Mar 2025 11:00) (54 - 76)  BP: 108/64 (11 Mar 2025 11:00) (108/64 - 183/75)  BP(mean): --  RR: 17 (11 Mar 2025 11:00) (17 - 18)  SpO2: 98% (11 Mar 2025 11:00) (96% - 98%)    Parameters below as of 11 Mar 2025 11:00  Patient On (Oxygen Delivery Method): room air               Daily     Daily     Labs, Interval Radiology studies, Medications reviewed by me

## 2025-03-24 NOTE — PROGRESS NOTE ADULT - REASON FOR ADMISSION
Acute metabolic encephalopathy secondary to UTI

## 2025-03-25 VITALS
DIASTOLIC BLOOD PRESSURE: 73 MMHG | SYSTOLIC BLOOD PRESSURE: 129 MMHG | OXYGEN SATURATION: 96 % | TEMPERATURE: 98 F | RESPIRATION RATE: 18 BRPM | HEART RATE: 86 BPM

## 2025-03-25 LAB — GLUCOSE BLDC GLUCOMTR-MCNC: 131 MG/DL — HIGH (ref 70–99)

## 2025-03-25 PROCEDURE — 99291 CRITICAL CARE FIRST HOUR: CPT

## 2025-03-25 PROCEDURE — 70450 CT HEAD/BRAIN W/O DYE: CPT | Mod: 26

## 2025-03-25 PROCEDURE — 99239 HOSP IP/OBS DSCHRG MGMT >30: CPT

## 2025-03-25 RX ORDER — LOPERAMIDE HCL 1 MG/7.5ML
2 SOLUTION ORAL ONCE
Refills: 0 | Status: DISCONTINUED | OUTPATIENT
Start: 2025-03-25 | End: 2025-03-25

## 2025-03-25 RX ADMIN — Medication 1 APPLICATION(S): at 13:01

## 2025-03-25 RX ADMIN — AMLODIPINE BESYLATE 5 MILLIGRAM(S): 10 TABLET ORAL at 05:18

## 2025-03-25 RX ADMIN — Medication 0.25 MILLIGRAM(S): at 05:18

## 2025-03-25 NOTE — PHYSICAL THERAPY INITIAL EVALUATION ADULT - ADDITIONAL COMMENTS
Per care coordination, patient lives in a co-op apartment, +elevator.
unable to reach son thru phone, Goals are created as per initial assessment, with anticipated best outcome

## 2025-03-25 NOTE — PHYSICAL THERAPY INITIAL EVALUATION ADULT - IMPAIRMENTS CONTRIBUTING IMPAIRED BED MOBILITY, REHAB EVAL
cognition/decreased strength
impaired balance/decreased flexibility/impaired postural control/decreased strength

## 2025-03-25 NOTE — PHYSICAL THERAPY INITIAL EVALUATION ADULT - GENERAL OBSERVATIONS, REHAB EVAL
Pt was received in supine, AxOX1, NAD inconsistently follows 1 step direction,
Patient encountered seated in hallway chair, in no distress.

## 2025-03-25 NOTE — PHYSICAL THERAPY INITIAL EVALUATION ADULT - PERTINENT HX OF CURRENT PROBLEM, REHAB EVAL
Patient admitted with UTI. Patient s/p RRT today for AMS, CT head negative.
As per H& P "History primarily obtained from son, Carlos Blackmon ([phone]) over the phone as patient is unable to provide history due to dementia and AMS. Son reports he last came to visit patient about two weeks ago. At that time, ambulatory unassisted and independent with all ADLs. There are cameras set up in patient's apartment and son noticed there was no movement reported on the cameras for the past week. He attempted to call her home phone but she did not . Therefore, he drove from [place] to her home today. When he arrived, he saw a chain on her apartment door. Son reports patient has bouts of paranoid schizophrenia where she chains up her door so no one can come inside. He cut the chain off with bolt cutters and found patient in bed covered in urine and feces. Son states there is no camera in her bedroom. Of note, patient is very hard of hearing and has a phone for hearing impaired but son states she stopped picking up the phone and refuses to wear hearing aids. Son states patient is supposed to take antihypertensive but refuses to take any medications. In addition, son states patient's apartment is unkempt and smells. Son does not think it is safe for patient to live alone and states she has refused to come live with him at his house so therefore, he would like her placed in a facility for safety. Patient herself is unable to provide any history and just keeps saying, "I do not hear well" despite me shouting in attempt to obtain history from patient.

## 2025-03-25 NOTE — PHYSICAL THERAPY INITIAL EVALUATION ADULT - BED MOBILITY LIMITATIONS, REHAB EVAL
decreased ability to use legs for bridging/pushing/impaired ability to control trunk for mobility
decreased ability to use arms for pushing/pulling/decreased ability to use legs for bridging/pushing/impaired ability to control trunk for mobility

## 2025-03-25 NOTE — PHYSICAL THERAPY INITIAL EVALUATION ADULT - GAIT DEVIATIONS NOTED, PT EVAL
decreased raj/decreased step length/decreased stride length/decreased weight-shifting ability
decreased raj/increased time in double stance

## 2025-03-25 NOTE — PHYSICAL THERAPY INITIAL EVALUATION ADULT - MODALITIES TREATMENT COMMENTS
L Breast Wound, 2 x 6 x 1.5
Left breast wound- full thickness wound with slough 20%, pale non granular tissue- [id number], dressing recs in POC , Surgical consult recommended

## 2025-03-25 NOTE — RAPID RESPONSE TEAM SUMMARY - NSSITUATIONBACKGROUNDRRT_GEN_ALL_CORE
Chloe Blackmon is an 84 year old female with PMHx of HTN, paranoid schizophrenia, and dementia who presented to the ED on 3/8/25 for complaints of inability to care for herself and admitted for acute metabolic encephalopathy secondary to UTI. RRT called by RN for acute mental status change. Patient was on her chair slumped over not responding to verbal commands or sternal rub. Patient transferred to bed where she became more alert and awake. Patient is demented at baseline. No history of seizures/stroke. Hospitalist at bedside.    
Chloe Blackmon is an 84 year old female with PMHx of HTN, paranoid schizophrenia, and dementia who presented to the ED on 3/8/25 for complaints of inability to care for herself. History primarily obtained from son, Carlos Blackmon ([phone]) over the phone as patient is unable to provide history due to dementia and AMS. Son reports he last came to visit patient about two weeks ago. At that time, ambulatory unassisted and independent with all ADLs.    RRT was called for patient found to be hypotensive by staff/family and patient was no longer communicating/answering questions as she once was. Upon arrival, patient not responding to name and would focus gaze towards one direction.

## 2025-03-31 DIAGNOSIS — H91.90 UNSPECIFIED HEARING LOSS, UNSPECIFIED EAR: ICD-10-CM

## 2025-03-31 DIAGNOSIS — I10 ESSENTIAL (PRIMARY) HYPERTENSION: ICD-10-CM

## 2025-03-31 DIAGNOSIS — N63.20 UNSPECIFIED LUMP IN THE LEFT BREAST, UNSPECIFIED QUADRANT: ICD-10-CM

## 2025-03-31 DIAGNOSIS — Z91.148 PATIENT'S OTHER NONCOMPLIANCE WITH MEDICATION REGIMEN FOR OTHER REASON: ICD-10-CM

## 2025-03-31 DIAGNOSIS — I95.9 HYPOTENSION, UNSPECIFIED: ICD-10-CM

## 2025-03-31 DIAGNOSIS — N39.0 URINARY TRACT INFECTION, SITE NOT SPECIFIED: ICD-10-CM

## 2025-03-31 DIAGNOSIS — R53.1 WEAKNESS: ICD-10-CM

## 2025-03-31 DIAGNOSIS — N81.4 UTEROVAGINAL PROLAPSE, UNSPECIFIED: ICD-10-CM

## 2025-03-31 DIAGNOSIS — N17.9 ACUTE KIDNEY FAILURE, UNSPECIFIED: ICD-10-CM

## 2025-03-31 DIAGNOSIS — K44.9 DIAPHRAGMATIC HERNIA WITHOUT OBSTRUCTION OR GANGRENE: ICD-10-CM

## 2025-03-31 DIAGNOSIS — I49.3 VENTRICULAR PREMATURE DEPOLARIZATION: ICD-10-CM

## 2025-03-31 DIAGNOSIS — F03.92 UNSPECIFIED DEMENTIA, UNSPECIFIED SEVERITY, WITH PSYCHOTIC DISTURBANCE: ICD-10-CM

## 2025-05-15 PROBLEM — Z00.00 ENCOUNTER FOR PREVENTIVE HEALTH EXAMINATION: Status: ACTIVE | Noted: 2025-05-15

## 2025-06-05 ENCOUNTER — APPOINTMENT (OUTPATIENT)
Dept: OBGYN | Facility: CLINIC | Age: 85
End: 2025-06-05

## 2025-07-18 ENCOUNTER — APPOINTMENT (OUTPATIENT)
Dept: OBGYN | Facility: CLINIC | Age: 85
End: 2025-07-18

## 2025-09-03 ENCOUNTER — APPOINTMENT (OUTPATIENT)
Dept: OBGYN | Facility: CLINIC | Age: 85
End: 2025-09-03
Payer: COMMERCIAL

## 2025-09-03 VITALS — DIASTOLIC BLOOD PRESSURE: 82 MMHG | SYSTOLIC BLOOD PRESSURE: 194 MMHG

## 2025-09-03 VITALS — DIASTOLIC BLOOD PRESSURE: 83 MMHG | WEIGHT: 188.5 LBS | SYSTOLIC BLOOD PRESSURE: 186 MMHG

## 2025-09-03 DIAGNOSIS — I50.9 HEART FAILURE, UNSPECIFIED: ICD-10-CM

## 2025-09-03 DIAGNOSIS — Z86.59 PERSONAL HISTORY OF OTHER MENTAL AND BEHAVIORAL DISORDERS: ICD-10-CM

## 2025-09-03 DIAGNOSIS — R13.10 DYSPHAGIA, UNSPECIFIED: ICD-10-CM

## 2025-09-03 DIAGNOSIS — I10 ESSENTIAL (PRIMARY) HYPERTENSION: ICD-10-CM

## 2025-09-03 DIAGNOSIS — L98.9 DISORDER OF THE SKIN AND SUBCUTANEOUS TISSUE, UNSPECIFIED: ICD-10-CM

## 2025-09-03 DIAGNOSIS — H91.90 UNSPECIFIED HEARING LOSS, UNSPECIFIED EAR: ICD-10-CM

## 2025-09-03 DIAGNOSIS — F03.90 UNSPECIFIED DEMENTIA W/OUT BEHAVIORAL DISTURBANCE: ICD-10-CM

## 2025-09-03 PROCEDURE — 99215 OFFICE O/P EST HI 40 MIN: CPT

## 2025-09-03 RX ORDER — RISPERIDONE 0.5 MG/1
0.5 TABLET ORAL TWICE DAILY
Refills: 0 | Status: ACTIVE | COMMUNITY
Start: 2025-09-03

## 2025-09-03 RX ORDER — MULTIVIT-MIN/IRON/FOLIC ACID/K 18-600-40
500 CAPSULE ORAL DAILY
Refills: 0 | Status: ACTIVE | COMMUNITY
Start: 2025-09-03

## 2025-09-03 RX ORDER — NIFEDIPINE 60 MG/1
60 TABLET, EXTENDED RELEASE ORAL DAILY
Qty: 30 | Refills: 0 | Status: ACTIVE | COMMUNITY
Start: 2025-09-03

## 2025-09-03 RX ORDER — FUROSEMIDE 20 MG/1
20 TABLET ORAL DAILY
Refills: 0 | Status: ACTIVE | COMMUNITY
Start: 2025-09-03

## 2025-09-08 ENCOUNTER — TRANSCRIPTION ENCOUNTER (OUTPATIENT)
Age: 85
End: 2025-09-08

## 2025-09-09 ENCOUNTER — TRANSCRIPTION ENCOUNTER (OUTPATIENT)
Age: 85
End: 2025-09-09